# Patient Record
Sex: FEMALE | URBAN - METROPOLITAN AREA
[De-identification: names, ages, dates, MRNs, and addresses within clinical notes are randomized per-mention and may not be internally consistent; named-entity substitution may affect disease eponyms.]

---

## 2017-09-01 PROBLEM — R55 SYNCOPE AND COLLAPSE: Status: ACTIVE | Noted: 2017-09-01

## 2017-09-02 PROBLEM — R55 SYNCOPE: Status: ACTIVE | Noted: 2017-09-02

## 2017-10-01 PROBLEM — A41.9 SEPSIS (HCC): Status: ACTIVE | Noted: 2017-10-01

## 2017-11-14 PROBLEM — R55 VASOVAGAL SYNCOPE: Status: ACTIVE | Noted: 2017-11-14

## 2017-11-14 PROBLEM — N39.0 UTI (URINARY TRACT INFECTION): Status: ACTIVE | Noted: 2017-11-14

## 2017-11-14 PROBLEM — E86.0 DEHYDRATION: Status: ACTIVE | Noted: 2017-11-14

## 2017-11-14 PROBLEM — N18.9 CKD (CHRONIC KIDNEY DISEASE): Status: ACTIVE | Noted: 2017-11-14

## 2018-02-12 ENCOUNTER — IMPORTED ENCOUNTER (OUTPATIENT)
Dept: URBAN - METROPOLITAN AREA CLINIC 1 | Facility: CLINIC | Age: 83
End: 2018-02-12

## 2018-02-12 PROBLEM — Z96.1: Noted: 2018-02-12

## 2018-02-12 PROBLEM — H25.812: Noted: 2018-02-12

## 2018-02-12 PROBLEM — H04.123: Noted: 2018-02-12

## 2018-02-12 PROBLEM — H18.413: Noted: 2018-02-12

## 2018-02-12 PROBLEM — H40.013: Noted: 2018-02-12

## 2018-02-12 PROCEDURE — 92133 CPTRZD OPH DX IMG PST SGM ON: CPT

## 2018-02-12 PROCEDURE — 92004 COMPRE OPH EXAM NEW PT 1/>: CPT

## 2018-02-12 PROCEDURE — 92015 DETERMINE REFRACTIVE STATE: CPT

## 2018-02-12 NOTE — PATIENT DISCUSSION
1.  Cataract OS: Visually Significant discussed the risks benefits alternatives and limitations of cataract surgery. The patient stated a full understanding and a desire to proceed with the procedure. The patient will need to return for preop appointment with cataract measurements and to have any additional questions answered and start pre-operative eye drops as directed. Patient did not see PMG today Phaco PCLOtherwise follow-up2. 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  -Fm HX. Condition was discussed with patient. Will monitor patient for progression. OCT was done today minimal thinning superior OU. Will do VF after phaco3. Dry Eyes OU -- Recommended to patient to use Artificial Tears BID OU4. Arcus OU5. Pseudophakia OD-observe 6. Return for an appointment for H and P. with Dr. Richmond Lindsey.

## 2018-02-18 ENCOUNTER — TELEPHONE (OUTPATIENT)
Dept: BEHAVIORAL/MENTAL HEALTH CLINIC | Age: 83
End: 2018-02-18

## 2018-02-23 ENCOUNTER — IMPORTED ENCOUNTER (OUTPATIENT)
Dept: URBAN - METROPOLITAN AREA CLINIC 1 | Facility: CLINIC | Age: 83
End: 2018-02-23

## 2018-02-23 ENCOUNTER — TELEPHONE (OUTPATIENT)
Dept: FAMILY MEDICINE CLINIC | Age: 83
End: 2018-02-23

## 2018-02-23 PROBLEM — H04.123: Noted: 2018-02-23

## 2018-02-23 PROBLEM — H26.491: Noted: 2018-02-23

## 2018-02-23 PROBLEM — Z96.1: Noted: 2018-02-23

## 2018-02-23 PROBLEM — H40.023: Noted: 2018-02-23

## 2018-02-23 PROBLEM — H25.812: Noted: 2018-02-23

## 2018-02-23 PROCEDURE — 99213 OFFICE O/P EST LOW 20 MIN: CPT

## 2018-02-23 PROCEDURE — 92136 OPHTHALMIC BIOMETRY: CPT

## 2018-02-23 PROCEDURE — 92012 INTRM OPH EXAM EST PATIENT: CPT

## 2018-02-23 NOTE — PATIENT DISCUSSION
1.  Cataract OS: Visually Significant discussed the risks benefits alternatives and limitations of cataract surgery. The patient stated a full understanding and a desire to proceed with the procedure. The patient will need to start pre-operative eye drops as directed. Proceed w/ phaco PCL2. PCO OD Observe and consider yag cap when pt feels pco visually significant and visual acuity decreases to appropriate level. 3. Dry Eyes OU- Stable. The continuation of artificial tears were recommended. 4.  Glaucoma Suspect OU (0.8 OU): Stable IOP OU. Re-eval after phaco. Patient is considered high risk. Condition was discussed with patient and patient understands. Will continue to monitor patient for any progression in condition. Patient was advised to call us with any problems questions or concerns. 5.  Pseudophakia OD [facility])- Doing well. 6.  Return for an appointment for sx OS with Dr. Tin Alejo.

## 2018-02-23 NOTE — TELEPHONE ENCOUNTER
[address] [place] [address] ([phone]) the medical records person is not in today. Name and number of our office was given to have med record personel return call on Monday. Left message with Birgit Fisher with [facility] [phone] to have someone call me or sent us most recent labs and discharge summary. Name and number was given to have Keisha Marroquin return call or fax information.

## 2018-02-27 ENCOUNTER — TELEPHONE (OUTPATIENT)
Dept: FAMILY MEDICINE CLINIC | Age: 83
End: 2018-02-27

## 2018-02-27 NOTE — TELEPHONE ENCOUNTER
Attempted to call office to retrieve information on patient.  staff stated that no one was in for medical records at this time. Name and number was given to him for someone to call me back. Need most recent lab and discharge summary.

## 2018-03-07 ENCOUNTER — IMPORTED ENCOUNTER (OUTPATIENT)
Dept: URBAN - METROPOLITAN AREA CLINIC 1 | Facility: CLINIC | Age: 83
End: 2018-03-07

## 2018-03-07 PROBLEM — H25.812 COMBINED FORM OF SENILE CATARACT OF LEFT EYE: Status: ACTIVE | Noted: 2018-03-07

## 2018-03-07 PROBLEM — H25.812 COMBINED FORM OF SENILE CATARACT OF LEFT EYE: Status: RESOLVED | Noted: 2018-03-07 | Resolved: 2018-03-07

## 2018-03-08 ENCOUNTER — IMPORTED ENCOUNTER (OUTPATIENT)
Dept: URBAN - METROPOLITAN AREA CLINIC 1 | Facility: CLINIC | Age: 83
End: 2018-03-08

## 2018-03-08 PROBLEM — Z96.1: Noted: 2018-03-08

## 2018-03-08 PROCEDURE — 99024 POSTOP FOLLOW-UP VISIT: CPT

## 2018-03-30 ENCOUNTER — IMPORTED ENCOUNTER (OUTPATIENT)
Dept: URBAN - METROPOLITAN AREA CLINIC 1 | Facility: CLINIC | Age: 83
End: 2018-03-30

## 2018-03-30 PROBLEM — Z96.1: Noted: 2018-03-30

## 2018-03-30 PROCEDURE — 99024 POSTOP FOLLOW-UP VISIT: CPT

## 2018-03-30 NOTE — PATIENT DISCUSSION
POW#3 Phaco/ PCL OS (Standard) Doing well Finish PO meds per schedule MRX for glasses given Return for an appointment in 4-6 mo 30 OCT with Dr. Valeria Zavaleta.

## 2018-04-30 ENCOUNTER — OFFICE VISIT (OUTPATIENT)
Dept: FAMILY MEDICINE CLINIC | Age: 83
End: 2018-04-30

## 2018-04-30 VITALS
WEIGHT: 116.2 LBS | HEIGHT: 62 IN | TEMPERATURE: 97.5 F | HEART RATE: 60 BPM | OXYGEN SATURATION: 96 % | SYSTOLIC BLOOD PRESSURE: 160 MMHG | DIASTOLIC BLOOD PRESSURE: 74 MMHG | BODY MASS INDEX: 21.38 KG/M2 | RESPIRATION RATE: 16 BRPM

## 2018-04-30 DIAGNOSIS — R60.0 BILATERAL EDEMA OF LOWER EXTREMITY: ICD-10-CM

## 2018-04-30 DIAGNOSIS — I10 ESSENTIAL HYPERTENSION: Primary | ICD-10-CM

## 2018-04-30 PROBLEM — R55 SYNCOPE AND COLLAPSE: Status: RESOLVED | Noted: 2017-09-01 | Resolved: 2018-04-30

## 2018-04-30 PROBLEM — A41.9 SEPSIS (HCC): Status: RESOLVED | Noted: 2017-10-01 | Resolved: 2018-04-30

## 2018-04-30 PROBLEM — R55 SYNCOPE: Status: RESOLVED | Noted: 2017-09-02 | Resolved: 2018-04-30

## 2018-04-30 PROBLEM — N39.0 UTI (URINARY TRACT INFECTION): Status: RESOLVED | Noted: 2017-11-14 | Resolved: 2018-04-30

## 2018-04-30 RX ORDER — AMLODIPINE BESYLATE 10 MG/1
10 TABLET ORAL DAILY
Qty: 90 TAB | Refills: 1 | Status: SHIPPED | OUTPATIENT
Start: 2018-04-30 | End: 2018-10-09 | Stop reason: SDUPTHER

## 2018-04-30 NOTE — PROGRESS NOTES
Chief Complaint   Patient presents with    New Patient     1. Have you been to the ER, urgent care clinic since your last visit? Hospitalized since your last visit? No    2. Have you seen or consulted any other health care providers outside of the [facility] since your last visit? Include any pap smears or colon screening.  No

## 2018-04-30 NOTE — MR AVS SNAPSHOT
[place] [address] [address] [address] 
[phone] Patient: Agatha Howe MRN: [id number] :1934 Visit Information Date & Time Provider Department Dept. Phone Encounter #  
 2018  1:00 PM Gary Mckinney MD [facility] [phone] [id number] Follow-up Instructions Return in about 4 weeks (around 2018), or if symptoms worsen or fail to improve, for HTN. Upcoming Health Maintenance Date Due DTaP/Tdap/Td series (1 - Tdap) 1955 ZOSTER VACCINE AGE 60> 1994 GLAUCOMA SCREENING Q2Y 1999 Bone Densitometry (Dexa) Screening 1999 Pneumococcal 65+ Low/Medium Risk (1 of 2 - PCV13) 1999 MEDICARE YEARLY EXAM 3/20/2018 Influenza Age 5 to Adult 2018 Allergies as of 2018  Review Complete On: 2018 By: Dewey Raines LPN Severity Noted Reaction Type Reactions Ace Inhibitors  2017    Angioedema Shellfish Derived  2018    Hives, Swelling Current Immunizations  Never Reviewed Name Date Influenza Vaccine 10/25/2014 12:00 AM  
 Pneumococcal Polysaccharide (PPSV-23) 10/25/2012 12:00 AM  
 Tetanus Toxoid 2013 12:00 AM  
  
 Not reviewed this visit You Were Diagnosed With   
  
 Codes Comments Essential hypertension    -  Primary ICD-10-CM: I10 
ICD-9-CM: 401.9 Bilateral edema of lower extremity     ICD-10-CM: R60.0 ICD-9-CM: 808. 3 Vitals BP Pulse Temp Resp Height(growth percentile) Weight(growth percentile) 160/74 60 97.5 °F (36.4 °C) (Oral) 16 5' 2\" (1.575 m) 116 lb 3.2 oz (52.7 kg) SpO2 Breastfeeding? BMI OB Status Smoking Status 96% No 21.25 kg/m2 Hysterectomy Never Smoker Vitals History BMI and BSA Data Body Mass Index Body Surface Area  
 21.25 kg/m 2 1.52 m 2 [facility] [address] [phone] Your Updated Medication List  
  
   
This list is accurate as of 4/30/18  1:51 PM.  Always use your most recent med list. amLODIPine 10 mg tablet Commonly known as:  Leggett Inks Take 1 Tab by mouth daily for 180 days. ascorbic acid (vitamin C) 250 mg tablet Commonly known as:  VITAMIN C Take 1 Tab by mouth two (2) times a day. ergocalciferol 50,000 unit capsule Commonly known as:  ERGOCALCIFEROL Take 1 Cap by mouth every seven (7) days. ferrous sulfate 325 mg (65 mg iron) tablet Commonly known as:  Iron (Ferrous Sulfate) Take 1 Tab by mouth two (2) times a day. mirtazapine 7.5 mg tablet Commonly known as:  Andrez Cooks Take 1 Tab by mouth nightly. Prescriptions Sent to Pharmacy Refills  
 amLODIPine (NORVASC) 10 mg tablet 1 Sig: Take 1 Tab by mouth daily for 180 days. Class: Normal  
 Pharmacy: [address] [place] [address], [address] [place] [address] #: [phone] Route: Oral  
  
Follow-up Instructions Return in about 4 weeks (around 5/28/2018), or if symptoms worsen or fail to improve, for HTN. Patient Instructions FOR BLOOD PRESSURE: 
TAKE AMLODIPINE 10 MG (2 OF 5 MG TABLETS) IN MORNING 
MAXIMUM SALT 1.5 GRAMS (1500 MG) IN 24 HOURS 
ELEVATE LEGS DURING DAY WHEN NOT UP AND WALKING AROUND FOR LEG SWELLING 
OKAY TO USE COMPRESSION STOCKINGS FOR LEG SWELLING 
RETURN IN 4 WEEKS 
BRING ADVANCED CARE PLANNING FORM BACK WITH YOU NEXT VISIT. DASH Diet: Care Instructions Your Care Instructions The DASH diet is an eating plan that can help lower your blood pressure. DASH stands for Dietary Approaches to Stop Hypertension. Hypertension is high blood pressure. The DASH diet focuses on eating foods that are high in calcium, potassium, and magnesium. These nutrients can lower blood pressure.  The foods that are highest in these nutrients are fruits, vegetables, low-fat dairy products, nuts, seeds, and legumes. But taking calcium, potassium, and magnesium supplements instead of eating foods that are high in those nutrients does not have the same effect. The DASH diet also includes whole grains, fish, and poultry. The DASH diet is one of several lifestyle changes your doctor may recommend to lower your high blood pressure. Your doctor may also want you to decrease the amount of sodium in your diet. Lowering sodium while following the DASH diet can lower blood pressure even further than just the DASH diet alone. Follow-up care is a key part of your treatment and safety. Be sure to make and go to all appointments, and call your doctor if you are having problems. It's also a good idea to know your test results and keep a list of the medicines you take. How can you care for yourself at home? Following the DASH diet · Eat 4 to 5 servings of fruit each day. A serving is 1 medium-sized piece of fruit, ½ cup chopped or canned fruit, 1/4 cup dried fruit, or 4 ounces (½ cup) of fruit juice. Choose fruit more often than fruit juice. · Eat 4 to 5 servings of vegetables each day. A serving is 1 cup of lettuce or raw leafy vegetables, ½ cup of chopped or cooked vegetables, or 4 ounces (½ cup) of vegetable juice. Choose vegetables more often than vegetable juice. · Get 2 to 3 servings of low-fat and fat-free dairy each day. A serving is 8 ounces of milk, 1 cup of yogurt, or 1 ½ ounces of cheese. · Eat 6 to 8 servings of grains each day. A serving is 1 slice of bread, 1 ounce of dry cereal, or ½ cup of cooked rice, pasta, or cooked cereal. Try to choose whole-grain products as much as possible. · Limit lean meat, poultry, and fish to 2 servings each day. A serving is 3 ounces, about the size of a deck of cards.  
· Eat 4 to 5 servings of nuts, seeds, and legumes (cooked dried beans, lentils, and split peas) each week. A serving is 1/3 cup of nuts, 2 tablespoons of seeds, or ½ cup of cooked beans or peas. · Limit fats and oils to 2 to 3 servings each day. A serving is 1 teaspoon of vegetable oil or 2 tablespoons of salad dressing. · Limit sweets and added sugars to 5 servings or less a week. A serving is 1 tablespoon jelly or jam, ½ cup sorbet, or 1 cup of lemonade. · Eat less than 2,300 milligrams (mg) of sodium a day. If you limit your sodium to 1,500 mg a day, you can lower your blood pressure even more. Tips for success · Start small. Do not try to make dramatic changes to your diet all at once. You might feel that you are missing out on your favorite foods and then be more likely to not follow the plan. Make small changes, and stick with them. Once those changes become habit, add a few more changes. · Try some of the following: ¨ Make it a goal to eat a fruit or vegetable at every meal and at snacks. This will make it easy to get the recommended amount of fruits and vegetables each day. ¨ Try yogurt topped with fruit and nuts for a snack or healthy dessert. ¨ Add lettuce, tomato, cucumber, and onion to sandwiches. ¨ Combine a ready-made pizza crust with low-fat mozzarella cheese and lots of vegetable toppings. Try using tomatoes, squash, spinach, broccoli, carrots, cauliflower, and onions. ¨ Have a variety of cut-up vegetables with a low-fat dip as an appetizer instead of chips and dip. ¨ Sprinkle sunflower seeds or chopped almonds over salads. Or try adding chopped walnuts or almonds to cooked vegetables. ¨ Try some vegetarian meals using beans and peas. Add garbanzo or kidney beans to salads. Make burritos and tacos with mashed grant beans or black beans. Where can you learn more? Go to http://pino-naomi.info/. Enter E072 in the search box to learn more about \"DASH Diet: Care Instructions. \" Current as of: September 21, 2016 Content Version: 11.4 © 4200-9121 Healthwise, Incorporated. Care instructions adapted under license by [employer] (which disclaims liability or warranty for this information). If you have questions about a medical condition or this instruction, always ask your healthcare professional. [address] any warranty or liability for your use of this information. Introducing [facility]! [facility] introduces BriefMe patient portal. Now you can access parts of your medical record, email your doctor's office, and request medication refills online. 1. In your internet browser, go to [email or web address] 2. Click on the First Time User? Click Here link in the Sign In box. You will see the New Member Sign Up page. 3. Enter your BriefMe Access Code exactly as it appears below. You will not need to use this code after youve completed the sign-up process. If you do not sign up before the expiration date, you must request a new code. · BriefMe Access Code: [email or web address] Expires: 6/5/2018  3:19 PM 
 
4. Enter the last four digits of your Social Security Number (xxxx) and Date of Birth (mm/dd/yyyy) as indicated and click Submit. You will be taken to the next sign-up page. 5. Create a BriefMe ID. This will be your BriefMe login ID and cannot be changed, so think of one that is secure and easy to remember. 6. Create a BriefMe password. You can change your password at any time. 7. Enter your Password Reset Question and Answer. This can be used at a later time if you forget your password. 8. Enter your e-mail address. You will receive e-mail notification when new information is available in [address] 9. Click Sign Up. You can now view and download portions of your medical record. 10. Click the Download Summary menu link to download a portable copy of your medical information.  
 
If you have questions, please visit the Frequently Asked Questions section of the Connect HQ. Remember, [employer] is NOT to be used for urgent needs. For medical emergencies, dial 911. Now available from your iPhone and Android! Please provide this summary of care documentation to your next provider. Your primary care clinician is listed as Ariela Jimenez. If you have any questions after today's visit, please call [phone].

## 2018-04-30 NOTE — PROGRESS NOTES
INTERNAL MEDICINE INITIAL PROVIDER VISIT    SUBJECTIVE:     Chief Complaint   Patient presents with    New Patient       HPI: 80 y.o. female with PMHx significant for uncontrolled HTN is here for the above chief complaint(s). Establish care: last PCP months ago last blood work at that time. States feels \"fine\" today. No physical complaints. Hypertension: Today BP is uncontrolled. Taking amlodipine 5 mg daily. No side effects from medications. Medication good compliance. Denies headache, lightheadedness, dizziness, vision changes, chest pain at rest or with exertion, rapid/irregular heart rate, shortness of breath at rest or with exertion cough, abdominal pain, legg pain. Chronic leg swelling worse with prolonged standing/sitting R>L. UTI/sepsis: [facility] 11/14-17/2017. Sx Resolved. drinking water. No pain with urination. No abdominal pain, nausea, vomiting. No fever or chills. No passing out episodes since hospitalization 11/2017 for urosepsis. ROS: 12 point ROS completed positive per HPI. Current Outpatient Prescriptions   Medication Sig    amLODIPine (NORVASC) 10 mg tablet Take 1 Tab by mouth daily for 180 days.  ergocalciferol (ERGOCALCIFEROL) 50,000 unit capsule Take 1 Cap by mouth every seven (7) days. (Patient taking differently: Take 50,000 Units by mouth two (2) times a week.)    mirtazapine (REMERON) 7.5 mg tablet Take 1 Tab by mouth nightly.  ferrous sulfate (IRON, FERROUS SULFATE,) 325 mg (65 mg iron) tablet Take 1 Tab by mouth two (2) times a day.  ascorbic acid, vitamin C, (VITAMIN C) 250 mg tablet Take 1 Tab by mouth two (2) times a day. No current facility-administered medications for this visit.       Health Maintenance   Topic Date Due    DTaP/Tdap/Td series (1 - Tdap) [birth year]28/1955    ZOSTER VACCINE AGE 60>  04/28/1994    GLAUCOMA SCREENING Q2Y  06/28/1999    Bone Densitometry (Dexa) Screening  06/28/1999    Pneumococcal 65+ Low/Medium Risk (2 of 2 - PCV13) 10/25/2013    MEDICARE YEARLY EXAM  03/20/2018    Influenza Age 5 to Adult  08/01/2018       Medications and Allergies: Reviewed and confirmed in the chart    Past Medical Hx: Reviewed and confirmed in the chart  Patient Active Problem List   Diagnosis Code    Vasovagal syncope R55    CKD (chronic kidney disease) N18.9    Bilateral edema of lower extremity R60.0    Chronic anemia D64.9    GERD (gastroesophageal reflux disease) K21.9    Congestive heart failure (CHF) ([facility]) I50.9    History of ETOH abuse Z87.898    Hypertension I10    Nephrolithiasis N20.0    Supraventricular bigeminy R00.8     Family Hx, Surgical Hx, Social Hx: Reviewed and updated in EMR    OBJECTIVE:  Vitals:    04/30/18 1315 04/30/18 1333   BP: 189/87 160/74   Pulse: 68 60   Resp: 16    Temp: 97.5 °F (36.4 °C)    TempSrc: Oral    SpO2: 96%    Weight: 116 lb 3.2 oz (52.7 kg)    Height: 5' 2\" (1.575 m)        BP Readings from Last 3 Encounters:   04/30/18 160/74   03/07/18 (!) 186/108   11/17/17 150/61     Wt Readings from Last 3 Encounters:   04/30/18 116 lb 3.2 oz (52.7 kg)   03/06/18 110 lb (49.9 kg)   11/17/17 121 lb 11.1 oz (55.2 kg)       General: Pleasant elderly woman sitting comfortably in no acute distress  HEENT: Head is atraumatic normo-cephalic. CVS: Heart regular, rate, and rhythm. Audible S1 and S2. No murmurs, rubs or gallops. PULM: Lungs clear to auscultation bilaterally. No wheezes, rales or rhonchi. GI: Positive bowel sounds, soft, nondistended, non-tender. EXT: 2+ radial pulses bilaterally. non pitting 1+ bilateral bi pedal edema R>L (per patient chronic)  Neuro: Alert and oriented x person, place, date and situation. Gait arthralgic and steady with 4 point cane. MSE: Mood euthymic, affect congruent and reactive. Nursing Notes Reviewed    ASSESSMENT AND PLAN    ICD-10-CM ICD-9-CM    1.  Essential hypertension I10 401.9 amLODIPine (NORVASC) 10 mg tablet INCREASED  SACHIN signed for past blood work  Increase water,   Limit caffiene  DASH diet  RTC in 4 weeks   2. Bilateral edema of lower extremity R60.0 782.3 Rx for compression stockings ordered  SACHIN for past PCP        Orders Placed This Encounter    amLODIPine (NORVASC) 10 mg tablet       Future Appointments  Date Time Provider Varg[place]se Leatha   5/30/2018 3:00 PM Ayden Adler MD [address] [place] [address]       AVS printed and provided to patient    Assessment and plan above discussed with patient, patient voiced understanding and agreement with plan. More than 50% of this 45 MIN visit was spent face to face counseling the patient about the etiology and treatment options for the above health conditions outlined in assessment and plan       Ayden Adler M.D.   [address], [address] [place] [address], [address] [place] [address] [phone]  [facility] [phone]

## 2018-04-30 NOTE — PATIENT INSTRUCTIONS
FOR BLOOD PRESSURE:  TAKE AMLODIPINE 10 MG (2 OF 5 MG TABLETS) IN MORNING  MAXIMUM SALT 1.5 GRAMS (1500 MG) IN 24 HOURS  ELEVATE LEGS DURING DAY WHEN NOT UP AND WALKING AROUND FOR LEG SWELLING  OKAY TO USE COMPRESSION STOCKINGS FOR LEG SWELLING  RETURN IN 4 WEEKS  BRING ADVANCED CARE PLANNING FORM BACK WITH YOU NEXT VISIT. DASH Diet: Care Instructions  Your Care Instructions    The DASH diet is an eating plan that can help lower your blood pressure. DASH stands for Dietary Approaches to Stop Hypertension. Hypertension is high blood pressure. The DASH diet focuses on eating foods that are high in calcium, potassium, and magnesium. These nutrients can lower blood pressure. The foods that are highest in these nutrients are fruits, vegetables, low-fat dairy products, nuts, seeds, and legumes. But taking calcium, potassium, and magnesium supplements instead of eating foods that are high in those nutrients does not have the same effect. The DASH diet also includes whole grains, fish, and poultry. The DASH diet is one of several lifestyle changes your doctor may recommend to lower your high blood pressure. Your doctor may also want you to decrease the amount of sodium in your diet. Lowering sodium while following the DASH diet can lower blood pressure even further than just the DASH diet alone. Follow-up care is a key part of your treatment and safety. Be sure to make and go to all appointments, and call your doctor if you are having problems. It's also a good idea to know your test results and keep a list of the medicines you take. How can you care for yourself at home? Following the DASH diet  · Eat 4 to 5 servings of fruit each day. A serving is 1 medium-sized piece of fruit, ½ cup chopped or canned fruit, 1/4 cup dried fruit, or 4 ounces (½ cup) of fruit juice. Choose fruit more often than fruit juice. · Eat 4 to 5 servings of vegetables each day.  A serving is 1 cup of lettuce or raw leafy vegetables, ½ cup of chopped or cooked vegetables, or 4 ounces (½ cup) of vegetable juice. Choose vegetables more often than vegetable juice. · Get 2 to 3 servings of low-fat and fat-free dairy each day. A serving is 8 ounces of milk, 1 cup of yogurt, or 1 ½ ounces of cheese. · Eat 6 to 8 servings of grains each day. A serving is 1 slice of bread, 1 ounce of dry cereal, or ½ cup of cooked rice, pasta, or cooked cereal. Try to choose whole-grain products as much as possible. · Limit lean meat, poultry, and fish to 2 servings each day. A serving is 3 ounces, about the size of a deck of cards. · Eat 4 to 5 servings of nuts, seeds, and legumes (cooked dried beans, lentils, and split peas) each week. A serving is 1/3 cup of nuts, 2 tablespoons of seeds, or ½ cup of cooked beans or peas. · Limit fats and oils to 2 to 3 servings each day. A serving is 1 teaspoon of vegetable oil or 2 tablespoons of salad dressing. · Limit sweets and added sugars to 5 servings or less a week. A serving is 1 tablespoon jelly or jam, ½ cup sorbet, or 1 cup of lemonade. · Eat less than 2,300 milligrams (mg) of sodium a day. If you limit your sodium to 1,500 mg a day, you can lower your blood pressure even more. Tips for success  · Start small. Do not try to make dramatic changes to your diet all at once. You might feel that you are missing out on your favorite foods and then be more likely to not follow the plan. Make small changes, and stick with them. Once those changes become habit, add a few more changes. · Try some of the following:  ¨ Make it a goal to eat a fruit or vegetable at every meal and at snacks. This will make it easy to get the recommended amount of fruits and vegetables each day. ¨ Try yogurt topped with fruit and nuts for a snack or healthy dessert. ¨ Add lettuce, tomato, cucumber, and onion to sandwiches. ¨ Combine a ready-made pizza crust with low-fat mozzarella cheese and lots of vegetable toppings.  Try using tomatoes, squash, spinach, broccoli, carrots, cauliflower, and onions. ¨ Have a variety of cut-up vegetables with a low-fat dip as an appetizer instead of chips and dip. ¨ Sprinkle sunflower seeds or chopped almonds over salads. Or try adding chopped walnuts or almonds to cooked vegetables. ¨ Try some vegetarian meals using beans and peas. Add garbanzo or kidney beans to salads. Make burritos and tacos with mashed grant beans or black beans. Where can you learn more? Go to http://pino-naomi.info/. Enter P841 in the search box to learn more about \"DASH Diet: Care Instructions. \"  Current as of: September 21, 2016  Content Version: 11.4  © 0366-3932 [employer]. Care instructions adapted under license by [employer] (which disclaims liability or warranty for this information). If you have questions about a medical condition or this instruction, always ask your healthcare professional. [address] any warranty or liability for your use of this information.

## 2018-05-30 ENCOUNTER — OFFICE VISIT (OUTPATIENT)
Dept: FAMILY MEDICINE CLINIC | Age: 83
End: 2018-05-30

## 2018-05-30 VITALS
TEMPERATURE: 97.1 F | HEIGHT: 62 IN | HEART RATE: 54 BPM | BODY MASS INDEX: 21.16 KG/M2 | OXYGEN SATURATION: 96 % | DIASTOLIC BLOOD PRESSURE: 70 MMHG | RESPIRATION RATE: 16 BRPM | SYSTOLIC BLOOD PRESSURE: 158 MMHG | WEIGHT: 115 LBS

## 2018-05-30 DIAGNOSIS — N18.9 CHRONIC KIDNEY DISEASE, UNSPECIFIED CKD STAGE: ICD-10-CM

## 2018-05-30 DIAGNOSIS — F03.90 MAJOR NEUROCOGNITIVE DISORDER (HCC): ICD-10-CM

## 2018-05-30 DIAGNOSIS — D50.9 IRON DEFICIENCY ANEMIA, UNSPECIFIED IRON DEFICIENCY ANEMIA TYPE: ICD-10-CM

## 2018-05-30 DIAGNOSIS — Z00.00 MEDICARE ANNUAL WELLNESS VISIT, SUBSEQUENT: ICD-10-CM

## 2018-05-30 DIAGNOSIS — I10 ESSENTIAL HYPERTENSION: Primary | ICD-10-CM

## 2018-05-30 DIAGNOSIS — R60.0 BILATERAL EDEMA OF LOWER EXTREMITY: ICD-10-CM

## 2018-05-30 DIAGNOSIS — H91.93 BILATERAL HEARING LOSS, UNSPECIFIED HEARING LOSS TYPE: ICD-10-CM

## 2018-05-30 NOTE — MR AVS SNAPSHOT
[address] [place] [address] [address] [address] 
[phone] Patient: Bunny Gao MRN: [id number] :1934 Visit Information Date & Time Provider Department Dept. Phone Encounter #  
 2018  3:00 PM Raquel Suarez MD [address] [phone] [id number] Follow-up Instructions Return in about 4 weeks (around 2018), or if symptoms worsen or fail to improve, for HTN CKD, anemia, DEMENTIA. Upcoming Health Maintenance Date Due DTaP/Tdap/Td series (1 - Tdap) 1955 ZOSTER VACCINE AGE 60> 1994 GLAUCOMA SCREENING Q2Y 1999 Bone Densitometry (Dexa) Screening 1999 Pneumococcal 65+ Low/Medium Risk (2 of 2 - PCV13) 10/25/2013 Influenza Age 5 to Adult 2018 MEDICARE YEARLY EXAM 2019 Allergies as of 2018  Review Complete On: 2018 By: Raquel Suarez MD  
  
 Severity Noted Reaction Type Reactions Ace Inhibitors  2017    Angioedema Shellfish Derived  2018    Hives, Swelling Current Immunizations  Never Reviewed Name Date Influenza Vaccine 10/25/2014 12:00 AM  
 Pneumococcal Polysaccharide (PPSV-23) 10/25/2012 12:00 AM  
 Tetanus Toxoid 2013 12:00 AM  
  
 Not reviewed this visit You Were Diagnosed With   
  
 Codes Comments Essential hypertension    -  Primary ICD-10-CM: I10 
ICD-9-CM: 401.9 Bilateral hearing loss, unspecified hearing loss type     ICD-10-CM: H91.93 
ICD-9-CM: 389.9 Bilateral edema of lower extremity     ICD-10-CM: R60.0 ICD-9-CM: 782.3 Iron deficiency anemia, unspecified iron deficiency anemia type     ICD-10-CM: D50.9 ICD-9-CM: 280.9 Medicare annual wellness visit, subsequent     ICD-10-CM: Z00.00 ICD-9-CM: V70.0 Chronic kidney disease, unspecified CKD stage     ICD-10-CM: N18.9 ICD-9-CM: 585.9 Major neurocognitive disorder     ICD-10-CM: F03.90 ICD-9-CM: 294.20 Vitals BP Pulse Temp Resp Height(growth percentile) Weight(growth percentile) 158/70 (!) 54 97.1 °F (36.2 °C) (Oral) 16 5' 2\" (1.575 m) 115 lb (52.2 kg) SpO2 BMI OB Status Smoking Status 96% 21.03 kg/m2 Hysterectomy Never Smoker Vitals History BMI and BSA Data Body Mass Index Body Surface Area 21.03 kg/m 2 1.51 m 2 Preferred Pharmacy Pharmacy Name Phone Deysi Chan, [facility] [phone] Your Updated Medication List  
  
   
This list is accurate as of 5/30/18  4:15 PM.  Always use your most recent med list. amLODIPine 10 mg tablet Commonly known as:  Delcie Marquez Take 1 Tab by mouth daily for 180 days. ascorbic acid (vitamin C) 250 mg tablet Commonly known as:  VITAMIN C Take 1 Tab by mouth two (2) times a day. ergocalciferol 50,000 unit capsule Commonly known as:  ERGOCALCIFEROL Take 1 Cap by mouth every seven (7) days. ferrous sulfate 325 mg (65 mg iron) tablet Commonly known as:  Iron (Ferrous Sulfate) Take 1 Tab by mouth two (2) times a day. mirtazapine 7.5 mg tablet Commonly known as:  Phyllistine Limber Take 1 Tab by mouth nightly. We Performed the Following REFERRAL TO AUDIOLOGY [REF7 Custom] Comments:  
 Please evaluate patient for hear loss, eval and treatment recs [facility]. Follow-up Instructions Return in about 4 weeks (around 6/27/2018), or if symptoms worsen or fail to improve, for HTN CKD, anemia, DEMENTIA. To-Do List   
 05/30/2018 Lab:  CBC WITH AUTOMATED DIFF   
  
 05/30/2018 Lab:  METABOLIC PANEL, BASIC Referral Information Referral ID Referred By Referred To  
  
 [id number] [place] [facility] Not Available Visits Status Start Date End Date 1 New Request 5/30/18 5/30/19  If your referral has a status of pending review or denied, additional information will be sent to support the outcome of this decision. Patient Instructions [place] BACK NEXT VISIT We are sending a referral to [phone] [facility] . You should be called about this in 1-2 weeks. If no word in 2 weeks please give us a call to follow up TAKE NORVASC EVERY DAY IN MORNING 10 MG 
 
GET COMPRESSION STOCKINGS RETURN TO CLINIC IN 4 WEEKS FOR FOLLOW UP OF BLOOD PRESSURE 
 
GET BLOOD WORK 1 WEEK BEFORE NEXT VISIT, NO NEED TO FAST, NO NEED TO SCHEDULE AN APPOINTMENT, LAB OPENS 8:30AM TO 4 PM EVERY DAY Learning About the Symptoms of Dementia What is dementia? We all forget things as we get older. Many older people have a slight loss of memory that does not affect their daily lives. But memory loss that gets worse may mean that you have dementia. Dementia is a loss of mental skills that affects your daily life. It can cause problems with memory, problem-solving, and learning. It also can cause problems with thinking and planning. Dementia usually gets worse over time. But how quickly it gets worse is different for each person. Some people stay the same for years. Others lose skills quickly. Your chances of having dementia rise as you get older. But this doesn't mean that everyone will get it. What causes dementia? Dementia is caused by damage to or changes in the brain. Alzheimer's disease is the most common kind of dementia. Alzheimer's causes a steady loss of memory and how well you can speak, think, and do your daily activities. The second most common kind of dementia is caused by a series of strokes. It's called vascular dementia. It often gets worse step by step. With each new stroke, more mental skills are lost. 
Serious head injuries in the past can sometimes lead to dementia, too. What are the symptoms? Usually the first symptom of dementia is memory loss.  Often the person who has the memory problem doesn't notice it, but family and friends do. People who have dementia may have increasing trouble with: 
· Recalling recent events. They may forget appointments or lose objects. · Recognizing people and places. · Keeping up with conversations and activity. · Finding their way around familiar places, or driving to and from places they know well. · Keeping up personal care such as grooming or bathing. · Planning and carrying out routine tasks. They may have trouble following a recipe or writing a letter or email. What are some next steps? If you are worried about memory loss, see your doctor soon. He or she can do a physical exam and ask questions about recent and past illnesses and life events. Think about bringing someone to your doctor's appointment to take notes for you. Your doctor may suggest a series of tests to measure memory changes over time. These tests give the doctor an overall picture of how well your brain is working. The doctor can use the results to decide the best treatment program and help make your life safer and easier. It is important to know that memory loss can be caused by things other than dementia. These things can include health problems such as depression, a low amount of thyroid hormone, and some infections. When those things are treated, your ability to remember can get better. Follow-up care is a key part of your treatment and safety. Be sure to make and go to all appointments, and call your doctor if you are having problems. It's also a good idea to know your test results and keep a list of the medicines you take. Where can you learn more? Go to http://pino-naomi.info/. Enter [phone] in the search box to learn more about \"Learning About the Symptoms of Dementia. \" Current as of: May 12, 2017 Content Version: 11.4 © 0096-2813 Healthwise, Incorporated.  Care instructions adapted under license by [address] (which disclaims liability or warranty for this information). If you have questions about a medical condition or this instruction, always ask your healthcare professional. [address] any warranty or liability for your use of this information. Introducing [facility] HE[facility]! [facility] introduces [employer] patient portal. Now you can access parts of your medical record, email your doctor's office, and request medication refills online. 1. In your internet browser, go to [email or web address] 2. Click on the First Time User? Click Here link in the Sign In box. You will see the New Member Sign Up page. 3. Enter your [employer] Access Code exactly as it appears below. You will not need to use this code after youve completed the sign-up process. If you do not sign up before the expiration date, you must request a new code. · [employer] Access Code: [email or web address] Expires: 6/5/2018  3:19 PM 
 
4. Enter the last four digits of your Social Security Number (xxxx) and Date of Birth (mm/dd/yyyy) as indicated and click Submit. You will be taken to the next sign-up page. 5. Create a [employer] ID. This will be your [employer] login ID and cannot be changed, so think of one that is secure and easy to remember. 6. Create a [employer] password. You can change your password at any time. 7. Enter your Password Reset Question and Answer. This can be used at a later time if you forget your password. 8. Enter your e-mail address. You will receive e-mail notification when new information is available in [address] 9. Click Sign Up. You can now view and download portions of your medical record. 10. Click the Download Summary menu link to download a portable copy of your medical information. If you have questions, please visit the Frequently Asked Questions section of the [employer] website.  Remember, [employer] is NOT to be used for urgent needs. For medical emergencies, dial 911. Now available from your iPhone and Android! Please provide this summary of care documentation to your next provider. Your primary care clinician is listed as Ena Kumar. If you have any questions after today's visit, please call [phone].

## 2018-05-30 NOTE — PROGRESS NOTES
Internal Medicine Follow Up Visit Note    Chief Complaint   Patient presents with    Hypertension    Memory Loss       HPI:  Marii Hoang is a 80 y.o.  female with history significant for CKD, h/o CVA, memory deficit, h/o urosepsis is here for the above complaint(s). Last seen 4/30/2018 for establish care. Son Zach Moore dropped off. Hypertension: Today BP is uncontrolled. Taking amlodipine 10 mg, increased last visit, last dose unsure, thinks she may have taken this AM. No side effects from medications. Medication fair compliance. Denies headache, lightheadedness, dizziness, vision changes, chest pain, rapid/irregular heart rate, shortness of breath, cough, abdominal pain. Chronic leg swelling, no compression stockings, unsure if she picked them up after last visit when they were prescribed. Water intake, unclear amount. Memory testing: Oriented to person, date, month, year, city. Taking medication independently, meal preparation, walking with can, toileting and dressing independently. Gets help with bills from granddaughter. Driving, gets help from son getting to doctors appointments. No forgetting to take medicines or make it to doctors appointments. Living by self. ROS: as per HPI    Current Outpatient Prescriptions   Medication Sig    amLODIPine (NORVASC) 10 mg tablet Take 1 Tab by mouth daily for 180 days.  ergocalciferol (ERGOCALCIFEROL) 50,000 unit capsule Take 1 Cap by mouth every seven (7) days. (Patient taking differently: Take 50,000 Units by mouth two (2) times a week.)    mirtazapine (REMERON) 7.5 mg tablet Take 1 Tab by mouth nightly.  ferrous sulfate (IRON, FERROUS SULFATE,) 325 mg (65 mg iron) tablet Take 1 Tab by mouth two (2) times a day.  ascorbic acid, vitamin C, (VITAMIN C) 250 mg tablet Take 1 Tab by mouth two (2) times a day. No current facility-administered medications for this visit.       Health Maintenance   Topic Date Due    DTaP/Tdap/Td series (1 - Tdap) 06/28/1955    ZOSTER VACCINE AGE 60>  04/28/1994    GLAUCOMA SCREENING Q2Y  06/28/1999    Bone Densitometry (Dexa) Screening  06/28/1999    Pneumococcal 65+ Low/Medium Risk (2 of 2 - PCV13) 10/25/2013    Influenza Age 9 to Adult  08/01/2018    MEDICARE YEARLY EXAM  05/31/2019     Immunization History   Administered Date(s) Administered    Influenza Vaccine 10/25/2014    Pneumococcal Polysaccharide (PPSV-23) 10/25/2012    Tetanus Toxoid 02/20/2013       Allergies and Medications: Reviewed and updated in EMR. Patient Active Problem List   Diagnosis Code    Vasovagal syncope R55    CKD (chronic kidney disease) N18.9    Bilateral edema of lower extremity R60.0    Iron deficiency anemia D50.9    GERD (gastroesophageal reflux disease) K21.9    Congestive heart failure (CHF) (HCC) I50.9    History of ETOH abuse Z87.898    Hypertension I10    Nephrolithiasis N20.0    Supraventricular bigeminy R00.8    Major neurocognitive disorder F03.90       Family History, Social History, Past Medical History, Surgical History: Reviewed and updated in EMR as appropriate. OBJECTIVE:   Visit Vitals    /70    Pulse (!) 54    Temp 97.1 °F (36.2 °C) (Oral)    Resp 16    Ht 5' 2\" (1.575 m)    Wt 115 lb (52.2 kg)    SpO2 96%  Comment: ra    BMI 21.03 kg/m2        BP Readings from Last 3 Encounters:   05/30/18 158/70   04/30/18 160/74   03/07/18 (!) 186/108     Wt Readings from Last 3 Encounters:   05/30/18 115 lb (52.2 kg)   04/30/18 116 lb 3.2 oz (52.7 kg)   03/06/18 110 lb (49.9 kg)       General: Pleasant elderly woman dressed casually, fair GH in no acute distress  HEENT: Head is atraumatic normo-cephalic. CVS:  Heart regular, rate, and rhythm. Audible S1 and S2. No murmurs, rubs or gallops. PULM: Lungs clear to auscultation bilaterally. No wheezes, rales or rhonchi. GI: Positive bowel sounds, soft, nondistended, non-tender. EXT: 2+ radial pulses bilaterally.  No pedal edema bilaterally  Neuro: Alert and oriented x3. Gait arthralgic with cane. MSE: mood eutymic, affect congruent    MOCA: 20/30 4/5 orientation, 2/3 identification, 2/6 attention, 1/3 language, 1/2 abstraction, 4/5 delayed recall, 5/6 orientation    Nursing Notes Reviewed. LABS/RADIOLOGICAL TESTS:    Lab Results   Component Value Date/Time    WBC 9.3 11/17/2017 05:34 AM    HGB 8.4 (L) 11/17/2017 05:34 AM    HCT 25.3 (L) 11/17/2017 05:34 AM    PLATELET 096 04/81/7579 05:34 AM     Lab Results   Component Value Date/Time    Sodium 139 11/17/2017 05:34 AM    Potassium 4.1 11/17/2017 05:34 AM    Chloride 110 (H) 11/17/2017 05:34 AM    CO2 20 (L) 11/17/2017 05:34 AM    Glucose 87 11/17/2017 05:34 AM    BUN 23 11/17/2017 05:34 AM    Creatinine 1.1 11/17/2017 05:34 AM     Lab Results   Component Value Date/Time    Cholesterol, total 171 11/16/2017 04:44 AM    HDL Cholesterol 69 11/16/2017 04:44 AM    LDL, calculated 80 11/16/2017 04:44 AM    Triglyceride 108 11/16/2017 04:44 AM          Basic Metabolic Panel (70/81/0890 9:50 AM)  Basic Metabolic Panel (35/42/6129 9:50 AM)   Component Value Ref Range   Potassium 4.5 3.5 - 5.5 mmol/L   SODIUM 139 133 - 145 mmol/L   CHLORIDE 101 98 - 110 mmol/L   Glucose 106 (H) 70 - 99 mg/dL   CALCIUM 9.7 8.4 - 10.5 mg/dL   BUN 31 (H) 6 - 22 mg/dL   CREATININE 1.6 (H) 0.8 - 1.4 mg/dL   CO2 20 20 - 32 mmol/L   eGFR African American 36.5 (L) >60.0   eGFR Non African American 30.1 (L) >60.0   ANION GAP 17.9 mmol/L     Basic Metabolic Panel (29/12/4052 9:50 AM)   Specimen Performing Laboratory   Blood [facility]    [address] [place] [address]      Basic Metabolic Panel (23/69/8547 9:50 AM)   Narrative   Estimated GFR results are reported in mL/min/1.73 sq.m. by the MDRD equation.        This eGFR is validated for stable chronic renal failure patients.  This equation is unreliable in acute illness or patients with normal renal function.             Back to top of Lab Results    CBC (12/06/2017 9:50 AM)  CBC (12/06/2017 9:50 AM)   Component Value Ref Range   WBC x 10*3 12.4 (H) 4.0 - 11.0 K/uL   RBC x 10^6 3. 18 (L) 3.80 - 5.20 M/uL   HGB 9.2 (L) 11.7 - 16.1 g/dL   HCT 29.8 (L) 35.1 - 48.3 %   MCV 94 80 - 95 fL   MCH 29 26 - 34 pg   MCHC 31 (L) 32 - 36 g/dL   RDW 16.0 10.0 - 16.0 %   Platelet 152 640 - 388 K/uL   MPV 10.0 6.0 - 10.8 fL     CBC (12/06/2017 9:50 AM)   Specimen Performing Laboratory   Blood [facility]   [address] [place] [address] Main    Results   MRI BRAIN WO CONT (Accession [id number]) (Order [id number])   Allergies      Unspecified: Ace Inhibitors; Shellfish Derived   Result Information   Status Provider Status      Final result (Exam End: 11/16/2017 11:57 AM) Open    Study Result   EXAMINATION: MRI brain without contrast     MEDICAL HISTORY: Focal neurologic deficit. .      TECHNIQUE: Multisequence, multiplanar MRI of the brain without contrast.     COMPARISONS: CT head dated 9/30/2017. MRI brain dated 9/1/2017.     FINDINGS:      No restricted diffusion to suggest acute demyelination or ischemia.     No focal mass, midline shift or acute intracranial hemorrhage.     Scattered subcortical, confluent periventricular and coalescent deep T2 and  FLAIR white matter hyperintensities, nonspecific though most consistent with  advanced small vessel ischemic change. Nonacute right caudate lacunar infarct  unchanged.     Several scattered punctate and small areas of magnetic susceptibility artifact  predominantly involving the temporal regions, most consistent with hemosiderin  deposition related to previous hemorrhage or cerebral amyloid angiopathy.  This  is unchanged when compared with the previous MRI.     Prominence of the ventricles, cisterns and other CSF containing spaces due to  parenchymal volume loss.     Flow voids within the skull base grossly unremarkable.     Opacification of the right sphenoid sinus and a few posterior ethmoid air cells.     IMPRESSION  IMPRESSION:  1. No focal mass,  midline shift or acute intracranial hemorrhage. 2. No evidence of acute ischemia. 3. Diffuse parenchymal volume loss and several patterns of nonacute ischemic  change. All lab results and radiological studies were reviewed and discussed with the patient. ASSESSMENT/PLAN:      ICD-10-CM ICD-9-CM    1. Essential hypertension P23 990.8 METABOLIC PANEL, BASIC  Continue amlodipine   2. Bilateral hearing loss, unspecified hearing loss type H91.93 389.9 REFERRAL TO AUDIOLOGY   3. Bilateral edema of lower extremity R60.0 782. 3 Compression stockings ordered again  Elevate legs   4. Iron deficiency anemia, unspecified iron deficiency anemia type D50.9 280.9 CBC WITH AUTOMATED DIFF   5. Medicare annual wellness visit, subsequent Z00.00 V70.0 Completed this visit   6. Chronic kidney disease, unspecified CKD stage Y20.2 864.1 METABOLIC PANEL, BASIC   7. Major neurocognitive disorder F03.90 294.20 Information provided  encouraged to have medication assistance every day  Discuss Rx next visit  Education provided   \Patient verbalized understanding and agreement with the plan. Patient was given an after-visit summary. Follow-up Disposition:  Return in about 4 weeks (around 6/27/2018), or if symptoms worsen or fail to improve, for HTN CKD, anemia, DEMENTIA. or sooner if worsening symptoms. More than 50% of this 60 min visit was spent counseling the patient face to face about etiology and treatment of health conditions outlined in assessment and plan. Gregory Cohen M.D. [address], [address] [place] [address], [address] [place] [address] [phone]  Fax - [place] [phone] VISIT NOTE    Addy Treadwell is a 80 y.o. female and presents for annual Medicare Wellness Visit. Problem List: Reviewed with patient and discussed risk factors.     Patient Active Problem List   Diagnosis Code    Vasovagal syncope R55    CKD (chronic kidney disease) N18.9    Bilateral edema of lower extremity R60.0    Iron deficiency anemia D50.9    GERD (gastroesophageal reflux disease) K21.9    Congestive heart failure (CHF) (HCC) I50.9    History of ETOH abuse Z87.898    Hypertension I10    Nephrolithiasis N20.0    Supraventricular bigeminy R00.8    Major neurocognitive disorder F03.90       Current medical providers:  Patient Care Team:  Chace Carpenter MD as PCP - General (Internal Medicine)    PSH: Reviewed with patient  Past Surgical History:   Procedure Laterality Date    HX GI      COLECTOMY    HX GI      COLONOSCOPY    HX HYSTERECTOMY      HYSTERECTOMY        SH: Reviewed with patient  Social History   Substance Use Topics    Smoking status: Never Smoker    Smokeless tobacco: Never Used    Alcohol use No      Comment: not since in rehab       FH: Reviewed with patient  Family History   Problem Relation Age of Onset    No Known Problems Mother     No Known Problems Father        Medications/Allergies: Reviewed with patient  Current Outpatient Prescriptions on File Prior to Visit   Medication Sig Dispense Refill    amLODIPine (NORVASC) 10 mg tablet Take 1 Tab by mouth daily for 180 days. 90 Tab 1    ergocalciferol (ERGOCALCIFEROL) 50,000 unit capsule Take 1 Cap by mouth every seven (7) days. (Patient taking differently: Take 50,000 Units by mouth two (2) times a week.) 4 Cap 3    mirtazapine (REMERON) 7.5 mg tablet Take 1 Tab by mouth nightly. 30 Tab 0    ferrous sulfate (IRON, FERROUS SULFATE,) 325 mg (65 mg iron) tablet Take 1 Tab by mouth two (2) times a day. 60 Tab 3    ascorbic acid, vitamin C, (VITAMIN C) 250 mg tablet Take 1 Tab by mouth two (2) times a day. 60 Tab 3     No current facility-administered medications on file prior to visit.        Allergies   Allergen Reactions    Ace Inhibitors Angioedema    Shellfish Derived Hives and Swelling       Objective:  Visit Vitals    /70    Pulse (!) 54    Temp 97.1 °F (36.2 °C) (Oral)    Resp 16    Ht 5' 2\" (1.575 m)    Wt 115 lb (52.2 kg)    SpO2 96%  Comment: ra    BMI 21.03 kg/m2    Body mass index is 21.03 kg/(m^2). Physical Exam: sitting comfortably in no acute distress. Assessment of cognitive impairment: Alert and oriented x 3. MOCA: 20/30 4/5 orientation, 2/3 identification, 2/6 attention, 1/3 language, 1/2 abstraction, 4/5 delayed recall, 5/6 orientation      Depression Screen:   PHQ over the last two weeks 4/30/2018   Little interest or pleasure in doing things Not at all   Feeling down, depressed or hopeless Not at all   Total Score PHQ 2 0       Fall Risk Assessment:    Fall Risk Assessment, last 12 mths 4/30/2018   Able to walk? Yes   Fall in past 12 months? No       Functional Ability:   Does the patient exhibit a steady gait? With cane   How long did it take the patient to get up and walk from a sitting position? ~20 seconds   Is the patient self reliant?  (ie can do own laundry, meals, household chores)  yes     Does the patient handle his/her own medications? Yes but not effectively     Does the patient handle his/her own money? no     Is the patients home safe (ie good lighting, handrails on stairs and bath, etc.)? no     Did you notice or did patient express any hearing difficulties? yes     Did you notice or did patient express any vision difficulties? Yes, wearing glasses     Were distance and reading eye charts used?   yes       Advance Care Planning:   Patient was offered the opportunity to discuss advance care planning:  yes     Does patient have an Advance Directive:  no   If no, did you provide information on Caring Connections?  no       Plan:    Compression stockings  Handout on dementia given  RTC in 4 weeks  Orders Placed This Encounter    METABOLIC PANEL, BASIC    CBC WITH AUTOMATED DIFF    REFERRAL TO [facility]   Topic Date Due    DTaP/Tdap/Td series (1 - Tdap) 06/28/1955    ZOSTER VACCINE AGE 60>  04/28/1994    GLAUCOMA SCREENING Q2Y  06/28/1999    Bone Densitometry (Dexa) Screening  06/28/1999    Pneumococcal 65+ Low/Medium Risk (2 of 2 - PCV13) 10/25/2013    Influenza Age 9 to Adult  08/01/2018    [address] [place] [address]  05/31/2019       *Patient verbalized understanding and agreement with the plan. A copy of the After Visit Summary with personalized health plan was given to the patient today. Lindsay Neal

## 2018-05-30 NOTE — PROGRESS NOTES
Chief Complaint   Patient presents with    Hypertension    Memory Loss     1. Have you been to the ER, urgent care clinic since your last visit? Hospitalized since your last visit? No    2. Have you seen or consulted any other health care providers outside of the [address] [place] [address] since your last visit? Include any pap smears or colon screening.  No

## 2018-05-30 NOTE — PATIENT INSTRUCTIONS
COMPLETE ADVANCED CARE PLANNING FORM AND BRING BACK NEXT VISIT    We are sending a referral to AUDIOLOGY FOR EVALUATION OF HEARING AIDS . You should be called about this in 1-2 weeks. If no word in 2 weeks please give us a call to follow up    [address] 10 MG    GET COMPRESSION STOCKINGS    RETURN TO CLINIC IN 4 WEEKS FOR FOLLOW UP OF BLOOD PRESSURE    GET BLOOD WORK 1 WEEK BEFORE NEXT VISIT, NO NEED TO FAST, NO NEED TO SCHEDULE AN APPOINTMENT, LAB OPENS 8:30AM TO 4 PM EVERY DAY           Learning About the Symptoms of Dementia  What is dementia? We all forget things as we get older. Many older people have a slight loss of memory that does not affect their daily lives. But memory loss that gets worse may mean that you have dementia. Dementia is a loss of mental skills that affects your daily life. It can cause problems with memory, problem-solving, and learning. It also can cause problems with thinking and planning. Dementia usually gets worse over time. But how quickly it gets worse is different for each person. Some people stay the same for years. Others lose skills quickly. Your chances of having dementia rise as you get older. But this doesn't mean that everyone will get it. What causes dementia? Dementia is caused by damage to or changes in the brain. Alzheimer's disease is the most common kind of dementia. Alzheimer's causes a steady loss of memory and how well you can speak, think, and do your daily activities. The second most common kind of dementia is caused by a series of strokes. It's called vascular dementia. It often gets worse step by step. With each new stroke, more mental skills are lost.  Serious head injuries in the past can sometimes lead to dementia, too. What are the symptoms? Usually the first symptom of dementia is memory loss. Often the person who has the memory problem doesn't notice it, but family and friends do.   People who have dementia may have increasing trouble with:  · Recalling recent events. They may forget appointments or lose objects. · Recognizing people and places. · Keeping up with conversations and activity. · Finding their way around familiar places, or driving to and from places they know well. · Keeping up personal care such as grooming or bathing. · Planning and carrying out routine tasks. They may have trouble following a recipe or writing a letter or email. What are some next steps? If you are worried about memory loss, see your doctor soon. He or she can do a physical exam and ask questions about recent and past illnesses and life events. Think about bringing someone to your doctor's appointment to take notes for you. Your doctor may suggest a series of tests to measure memory changes over time. These tests give the doctor an overall picture of how well your brain is working. The doctor can use the results to decide the best treatment program and help make your life safer and easier. It is important to know that memory loss can be caused by things other than dementia. These things can include health problems such as depression, a low amount of thyroid hormone, and some infections. When those things are treated, your ability to remember can get better. Follow-up care is a key part of your treatment and safety. Be sure to make and go to all appointments, and call your doctor if you are having problems. It's also a good idea to know your test results and keep a list of the medicines you take. Where can you learn more? Go to http://pino-naomi.info/. Enter 035 [id number] [id number] in the search box to learn more about \"Learning About the Symptoms of Dementia. \"  Current as of: May 12, 2017  Content Version: 11.4  © 9045-7429 Healthwise, Incorporated. Care instructions adapted under license by [employer] (which disclaims liability or warranty for this information).  If you have questions about a medical condition or this instruction, always ask your healthcare professional. [place] [address] any warranty or liability for your use of this information.

## 2018-06-27 ENCOUNTER — HOSPITAL ENCOUNTER (OUTPATIENT)
Dept: LAB | Age: 83
Discharge: HOME | End: 2018-06-27
Payer: MEDICARE

## 2018-06-27 ENCOUNTER — OFFICE VISIT (OUTPATIENT)
Dept: FAMILY MEDICINE CLINIC | Age: 83
End: 2018-06-27

## 2018-06-27 VITALS
DIASTOLIC BLOOD PRESSURE: 68 MMHG | BODY MASS INDEX: 21.49 KG/M2 | SYSTOLIC BLOOD PRESSURE: 142 MMHG | HEART RATE: 69 BPM | HEIGHT: 62 IN | WEIGHT: 116.8 LBS | TEMPERATURE: 96.5 F | RESPIRATION RATE: 14 BRPM

## 2018-06-27 DIAGNOSIS — D50.9 IRON DEFICIENCY ANEMIA, UNSPECIFIED IRON DEFICIENCY ANEMIA TYPE: ICD-10-CM

## 2018-06-27 DIAGNOSIS — N18.9 CHRONIC KIDNEY DISEASE, UNSPECIFIED CKD STAGE: ICD-10-CM

## 2018-06-27 DIAGNOSIS — I10 ESSENTIAL HYPERTENSION: Primary | ICD-10-CM

## 2018-06-27 DIAGNOSIS — I10 ESSENTIAL HYPERTENSION: ICD-10-CM

## 2018-06-27 LAB
ANION GAP SERPL CALC-SCNC: 9 MMOL/L (ref 3–18)
BASOPHILS # BLD: 0 K/UL (ref 0–0.06)
BASOPHILS NFR BLD: 0 % (ref 0–2)
BUN SERPL-MCNC: 28 MG/DL (ref 7–18)
BUN/CREAT SERPL: 20 (ref 12–20)
CALCIUM SERPL-MCNC: 9.1 MG/DL (ref 8.5–10.1)
CHLORIDE SERPL-SCNC: 107 MMOL/L (ref 100–108)
CO2 SERPL-SCNC: 26 MMOL/L (ref 21–32)
CREAT SERPL-MCNC: 1.38 MG/DL (ref 0.6–1.3)
DIFFERENTIAL METHOD BLD: ABNORMAL
EOSINOPHIL # BLD: 0.2 K/UL (ref 0–0.4)
EOSINOPHIL NFR BLD: 2 % (ref 0–5)
ERYTHROCYTE [DISTWIDTH] IN BLOOD BY AUTOMATED COUNT: 14 % (ref 11.6–14.5)
GLUCOSE SERPL-MCNC: 85 MG/DL (ref 74–99)
HCT VFR BLD AUTO: 31.9 % (ref 35–45)
HGB BLD-MCNC: 10.4 G/DL (ref 12–16)
LYMPHOCYTES # BLD: 2.1 K/UL (ref 0.9–3.6)
LYMPHOCYTES NFR BLD: 22 % (ref 21–52)
MCH RBC QN AUTO: 30.1 PG (ref 24–34)
MCHC RBC AUTO-ENTMCNC: 32.6 G/DL (ref 31–37)
MCV RBC AUTO: 92.2 FL (ref 74–97)
MONOCYTES # BLD: 0.7 K/UL (ref 0.05–1.2)
MONOCYTES NFR BLD: 7 % (ref 3–10)
NEUTS SEG # BLD: 6.4 K/UL (ref 1.8–8)
NEUTS SEG NFR BLD: 69 % (ref 40–73)
PLATELET # BLD AUTO: 210 K/UL (ref 135–420)
PMV BLD AUTO: 10.1 FL (ref 9.2–11.8)
POTASSIUM SERPL-SCNC: 3.7 MMOL/L (ref 3.5–5.5)
RBC # BLD AUTO: 3.46 M/UL (ref 4.2–5.3)
SODIUM SERPL-SCNC: 142 MMOL/L (ref 136–145)
WBC # BLD AUTO: 9.3 K/UL (ref 4.6–13.2)

## 2018-06-27 PROCEDURE — 36415 COLL VENOUS BLD VENIPUNCTURE: CPT | Performed by: INTERNAL MEDICINE

## 2018-06-27 PROCEDURE — 80048 BASIC METABOLIC PNL TOTAL CA: CPT | Performed by: INTERNAL MEDICINE

## 2018-06-27 PROCEDURE — 85025 COMPLETE CBC W/AUTO DIFF WBC: CPT | Performed by: INTERNAL MEDICINE

## 2018-06-27 RX ORDER — LOSARTAN POTASSIUM 25 MG/1
25 TABLET ORAL DAILY
Qty: 90 TAB | Refills: 0 | Status: SHIPPED | OUTPATIENT
Start: 2018-06-27 | End: 2018-10-03 | Stop reason: SDUPTHER

## 2018-06-27 NOTE — MR AVS SNAPSHOT
[facility] 
 
 
 [address] [facility] [address] [address] 
[phone] Patient: Agatha Howe MRN: [id number] :1934 Visit Information Date & Time Provider Department Dept. Phone Encounter #  
 2018  4:00 PM Gary Mckinney MD [address] [facility] [phone] [id number] Follow-up Instructions Return in about 5 weeks (around 2018) for HTN. Your Appointments 2018  4:00 PM  
ROUTINE CARE with Gary Mckinney MD  
[address] [facility] ([facility]) Appt Note: Return in about 4 weeks (around 2018), or if symptoms worsen or fail to improve, for HTN CKD, anemia, DEMENTIA [address] [place] [address] [address] [address]  
[phone]  
  
   
 [address] [place] [address] [address] Upcoming Health Maintenance Date Due DTaP/Tdap/Td series (1 - Tdap) 1955 ZOSTER VACCINE AGE 60> 1994 GLAUCOMA SCREENING Q2Y 1999 Bone Densitometry (Dexa) Screening 1999 Pneumococcal 65+ Low/Medium Risk (2 of 2 - PCV13) 10/25/2013 Influenza Age 5 to Adult 2018 MEDICARE YEARLY EXAM 2019 Allergies as of 2018  Review Complete On: 2018 By: Dewey Raines LPN Severity Noted Reaction Type Reactions Ace Inhibitors  2017    Angioedema Shellfish Derived  2018    Hives, Swelling Current Immunizations  Never Reviewed Name Date Influenza Vaccine 10/25/2014 12:00 AM  
 Pneumococcal Polysaccharide (PPSV-23) 10/25/2012 12:00 AM  
 Tetanus Toxoid 2013 12:00 AM  
  
 Not reviewed this visit You Were Diagnosed With   
  
 Codes Comments Essential hypertension    -  Primary ICD-10-CM: I10 
ICD-9-CM: 401.9 Vitals BP Pulse Temp Resp Height(growth percentile) Weight(growth percentile) 142/68 69 96.5 °F (35.8 °C) (Oral) 14 5' 2\" (1.575 m) 116 lb 12.8 oz (53 kg) BMI OB Status Smoking Status 21.36 kg/m2 Hysterectomy Never Smoker Vitals History BMI and BSA Data Body Mass Index Body Surface Area  
 21.36 kg/m 2 1.52 m 2 Preferred Pharmacy Pharmacy Name Phone [facility] [phone] Your Updated Medication List  
  
   
This list is accurate as of 6/27/18  3:50 PM.  Always use your most recent med list. amLODIPine 10 mg tablet Commonly known as:  Odalis Corona Take 1 Tab by mouth daily for 180 days. ascorbic acid (vitamin C) 250 mg tablet Commonly known as:  VITAMIN C Take 1 Tab by mouth two (2) times a day. ergocalciferol 50,000 unit capsule Commonly known as:  ERGOCALCIFEROL Take 1 Cap by mouth every seven (7) days. ferrous sulfate 325 mg (65 mg iron) tablet Commonly known as:  Iron (Ferrous Sulfate) Take 1 Tab by mouth two (2) times a day. losartan 25 mg tablet Commonly known as:  COZAAR Take 1 Tab by mouth daily for 90 days. mirtazapine 7.5 mg tablet Commonly known as:  Andriy Font Take 1 Tab by mouth nightly. Prescriptions Sent to Pharmacy Refills  
 losartan (COZAAR) 25 mg tablet 0 Sig: Take 1 Tab by mouth daily for 90 days. Class: Normal  
 Pharmacy: [facility] , [address] [place] [address] #: [phone] Route: Oral  
  
Follow-up Instructions Return in about 5 weeks (around 8/1/2018) for HTN. Patient Instructions Rocio3 Angeles Orona FORM BACK NEXT VISIT [address] [facility] PAPI MIRANDA [address] [place] [address], [address] Phone: [phone] CALL ABOVE TO SCHEDULE APPOINTMENT FOR HEARING EVALUATION 
 
FOR BLOOD PRESSURE: 
Start taking losartan 25 mg daily , IF YOU DONT GET THESE MEDICINES IN 5 DAYS CALL CLINIC TO DISCUSS Return for blood work in 2-3 weeks Continue amlodipine 10 mg daily Max salt 2 grams per 24 hrs Try to walk 30 minutes a day DASH Diet: Care Instructions Your Care Instructions The DASH diet is an eating plan that can help lower your blood pressure. DASH stands for Dietary Approaches to Stop Hypertension. Hypertension is high blood pressure. The DASH diet focuses on eating foods that are high in calcium, potassium, and magnesium. These nutrients can lower blood pressure. The foods that are highest in these nutrients are fruits, vegetables, low-fat dairy products, nuts, seeds, and legumes. But taking calcium, potassium, and magnesium supplements instead of eating foods that are high in those nutrients does not have the same effect. The DASH diet also includes whole grains, fish, and poultry. The DASH diet is one of several lifestyle changes your doctor may recommend to lower your high blood pressure. Your doctor may also want you to decrease the amount of sodium in your diet. Lowering sodium while following the DASH diet can lower blood pressure even further than just the DASH diet alone. Follow-up care is a key part of your treatment and safety. Be sure to make and go to all appointments, and call your doctor if you are having problems. It's also a good idea to know your test results and keep a list of the medicines you take. How can you care for yourself at home? Following the DASH diet · Eat 4 to 5 servings of fruit each day. A serving is 1 medium-sized piece of fruit, ½ cup chopped or canned fruit, 1/4 cup dried fruit, or 4 ounces (½ cup) of fruit juice. Choose fruit more often than fruit juice. · Eat 4 to 5 servings of vegetables each day. A serving is 1 cup of lettuce or raw leafy vegetables, ½ cup of chopped or cooked vegetables, or 4 ounces (½ cup) of vegetable juice. Choose vegetables more often than vegetable juice. · Get 2 to 3 servings of low-fat and fat-free dairy each day.  A serving is 8 ounces of milk, 1 cup of yogurt, or 1 ½ ounces of cheese. · Eat 6 to 8 servings of grains each day. A serving is 1 slice of bread, 1 ounce of dry cereal, or ½ cup of cooked rice, pasta, or cooked cereal. Try to choose whole-grain products as much as possible. · Limit lean meat, poultry, and fish to 2 servings each day. A serving is 3 ounces, about the size of a deck of cards. · Eat 4 to 5 servings of nuts, seeds, and legumes (cooked dried beans, lentils, and split peas) each week. A serving is 1/3 cup of nuts, 2 tablespoons of seeds, or ½ cup of cooked beans or peas. · Limit fats and oils to 2 to 3 servings each day. A serving is 1 teaspoon of vegetable oil or 2 tablespoons of salad dressing. · Limit sweets and added sugars to 5 servings or less a week. A serving is 1 tablespoon jelly or jam, ½ cup sorbet, or 1 cup of lemonade. · Eat less than 2,300 milligrams (mg) of sodium a day. If you limit your sodium to 1,500 mg a day, you can lower your blood pressure even more. Tips for success · Start small. Do not try to make dramatic changes to your diet all at once. You might feel that you are missing out on your favorite foods and then be more likely to not follow the plan. Make small changes, and stick with them. Once those changes become habit, add a few more changes. · Try some of the following: ¨ Make it a goal to eat a fruit or vegetable at every meal and at snacks. This will make it easy to get the recommended amount of fruits and vegetables each day. ¨ Try yogurt topped with fruit and nuts for a snack or healthy dessert. ¨ Add lettuce, tomato, cucumber, and onion to sandwiches. ¨ Combine a ready-made pizza crust with low-fat mozzarella cheese and lots of vegetable toppings. Try using tomatoes, squash, spinach, broccoli, carrots, cauliflower, and onions. ¨ Have a variety of cut-up vegetables with a low-fat dip as an appetizer instead of chips and dip. ¨ Sprinkle sunflower seeds or chopped almonds over salads. Or try adding chopped walnuts or almonds to cooked vegetables. ¨ Try some vegetarian meals using beans and peas. Add garbanzo or kidney beans to salads. Make burritos and tacos with mashed grant beans or black beans. Where can you learn more? Go to http://pino-naomi.info/. Enter F506 in the search box to learn more about \"DASH Diet: Care Instructions. \" Current as of: September 21, 2016 Content Version: 11.4 © 7377-6908 [employer] Interactive. Care instructions adapted under license by [employer] (which disclaims liability or warranty for this information). If you have questions about a medical condition or this instruction, always ask your healthcare professional. [address] any warranty or liability for your use of this information. Introducing [facility] [place] [facility]! [employer] introduces [employer] patient portal. Now you can access parts of your medical record, email your doctor's office, and request medication refills online. 1. In your internet browser, go to [email or web address] 2. Click on the First Time User? Click Here link in the Sign In box. You will see the New Member Sign Up page. 3. Enter your [employer] Access Code exactly as it appears below. You will not need to use this code after youve completed the sign-up process. If you do not sign up before the expiration date, you must request a new code. · [employer] Access Code: [email or web address] Expires: 9/25/2018  3:50 PM 
 
4. Enter the last four digits of your Social Security Number (xxxx) and Date of Birth (mm/dd/yyyy) as indicated and click Submit. You will be taken to the next sign-up page. 5. Create a [employer] ID. This will be your [employer] login ID and cannot be changed, so think of one that is secure and easy to remember. 6. Create a [employer] password. You can change your password at any time. 7. Enter your Password Reset Question and Answer. This can be used at a later time if you forget your password. 8. Enter your e-mail address. You will receive e-mail notification when new information is available in [address] 9. Click Sign Up. You can now view and download portions of your medical record. 10. Click the Download Summary menu link to download a portable copy of your medical information. If you have questions, please visit the Frequently Asked Questions section of the [employer] website. Remember, [employer] is NOT to be used for urgent needs. For medical emergencies, dial 911. Now available from your iPhone and Android! Please provide this summary of care documentation to your next provider. Your primary care clinician is listed as Caitlin Kohli. If you have any questions after today's visit, please call [phone].

## 2018-06-27 NOTE — PATIENT INSTRUCTIONS
[facility] ADVANCED CARE PLANNING FORM BACK NEXT VISIT    [facility]  [address]   [address]   [address] [facility] [address]   [place], [address]  Phone: [phone]  CALL ABOVE TO SCHEDULE APPOINTMENT FOR HEARING EVALUATION    FOR BLOOD PRESSURE:  Start taking losartan 25 mg daily , IF YOU DONT GET THESE MEDICINES IN 5 DAYS CALL CLINIC TO DISCUSS  Return for blood work in 2-3 weeks  Continue amlodipine 10 mg daily  Max salt 2 grams per 24 hrs  Try to walk 30 minutes a day         DASH Diet: Care Instructions  Your Care Instructions    The DASH diet is an eating plan that can help lower your blood pressure. DASH stands for Dietary Approaches to Stop Hypertension. Hypertension is high blood pressure. The DASH diet focuses on eating foods that are high in calcium, potassium, and magnesium. These nutrients can lower blood pressure. The foods that are highest in these nutrients are fruits, vegetables, low-fat dairy products, nuts, seeds, and legumes. But taking calcium, potassium, and magnesium supplements instead of eating foods that are high in those nutrients does not have the same effect. The DASH diet also includes whole grains, fish, and poultry. The DASH diet is one of several lifestyle changes your doctor may recommend to lower your high blood pressure. Your doctor may also want you to decrease the amount of sodium in your diet. Lowering sodium while following the DASH diet can lower blood pressure even further than just the DASH diet alone. Follow-up care is a key part of your treatment and safety. Be sure to make and go to all appointments, and call your doctor if you are having problems. It's also a good idea to know your test results and keep a list of the medicines you take. How can you care for yourself at home? Following the DASH diet  · Eat 4 to 5 servings of fruit each day.  A serving is 1 medium-sized piece of fruit, ½ cup chopped or canned fruit, 1/4 cup dried fruit, or 4 ounces (½ cup) of fruit juice. Choose fruit more often than fruit juice. · Eat 4 to 5 servings of vegetables each day. A serving is 1 cup of lettuce or raw leafy vegetables, ½ cup of chopped or cooked vegetables, or 4 ounces (½ cup) of vegetable juice. Choose vegetables more often than vegetable juice. · Get 2 to 3 servings of low-fat and fat-free dairy each day. A serving is 8 ounces of milk, 1 cup of yogurt, or 1 ½ ounces of cheese. · Eat 6 to 8 servings of grains each day. A serving is 1 slice of bread, 1 ounce of dry cereal, or ½ cup of cooked rice, pasta, or cooked cereal. Try to choose whole-grain products as much as possible. · Limit lean meat, poultry, and fish to 2 servings each day. A serving is 3 ounces, about the size of a deck of cards. · Eat 4 to 5 servings of nuts, seeds, and legumes (cooked dried beans, lentils, and split peas) each week. A serving is 1/3 cup of nuts, 2 tablespoons of seeds, or ½ cup of cooked beans or peas. · Limit fats and oils to 2 to 3 servings each day. A serving is 1 teaspoon of vegetable oil or 2 tablespoons of salad dressing. · Limit sweets and added sugars to 5 servings or less a week. A serving is 1 tablespoon jelly or jam, ½ cup sorbet, or 1 cup of lemonade. · Eat less than 2,300 milligrams (mg) of sodium a day. If you limit your sodium to 1,500 mg a day, you can lower your blood pressure even more. Tips for success  · Start small. Do not try to make dramatic changes to your diet all at once. You might feel that you are missing out on your favorite foods and then be more likely to not follow the plan. Make small changes, and stick with them. Once those changes become habit, add a few more changes. · Try some of the following:  ¨ Make it a goal to eat a fruit or vegetable at every meal and at snacks. This will make it easy to get the recommended amount of fruits and vegetables each day. ¨ Try yogurt topped with fruit and nuts for a snack or healthy dessert.   ¨ Add lettuce, tomato, cucumber, and onion to sandwiches. ¨ Combine a ready-made pizza crust with low-fat mozzarella cheese and lots of vegetable toppings. Try using tomatoes, squash, spinach, broccoli, carrots, cauliflower, and onions. ¨ Have a variety of cut-up vegetables with a low-fat dip as an appetizer instead of chips and dip. ¨ Sprinkle sunflower seeds or chopped almonds over salads. Or try adding chopped walnuts or almonds to cooked vegetables. ¨ Try some vegetarian meals using beans and peas. Add garbanzo or kidney beans to salads. Make burritos and tacos with mashed grant beans or black beans. Where can you learn more? Go to http://pino-naomi.info/. Enter F029 in the search box to learn more about \"DASH Diet: Care Instructions. \"  Current as of: September 21, 2016  Content Version: 11.4  © 8130-5770 Healthwise, [employer]. Care instructions adapted under license by [employer] (which disclaims liability or warranty for this information). If you have questions about a medical condition or this instruction, always ask your healthcare professional. [place] [address] any warranty or liability for your use of this information.

## 2018-06-27 NOTE — PROGRESS NOTES
Chief Complaint   Patient presents with    Hypertension    Chronic Kidney Disease       Pt preferred language for health care discussion is English. Is someone accompanying this pt? friend    Is the patient using any DME equipment during [address]? no    Pt currently taking Antiplatelet therapy? no    Depression Screening:  PHQ over the last two weeks 6/27/2018 4/30/2018 4/30/2018   Little interest or pleasure in doing things Not at all Not at all Not at all   Feeling down, depressed or hopeless Not at all Not at all Not at all   Total Score PHQ 2 0 0 0       Learning Assessment:  Learning Assessment 6/27/2018   PRIMARY LEARNER Patient   HIGHEST LEVEL OF EDUCATION - PRIMARY LEARNER  DID NOT GRADUATE HIGH SCHOOL   PRIMARY LANGUAGE ENGLISH   LEARNER PREFERENCE PRIMARY READING   ANSWERED BY Nils Jameson   RELATIONSHIP SELF       Abuse Screening:  Abuse Screening Questionnaire 6/27/2018   Do you ever feel afraid of your partner? N   Are you in a relationship with someone who physically or mentally threatens you? N   Is it safe for you to go home? Y       Fall Risk  Fall Risk Assessment, last 12 mths 6/27/2018 4/30/2018   Able to walk? Yes Yes   Fall in past 12 months? No No       Health Maintenance reviewed, discussed with patient and ordered per Provider. Health Maintenance Due   Topic Date Due    DTaP/Tdap/Td series (1 - Tdap) [birth year]28/1955    ZOSTER VACCINE AGE 60>  04/28/1994    GLAUCOMA SCREENING Q2Y  06/28/1999    Bone Densitometry (Dexa) Screening  06/28/1999    Pneumococcal 65+ Low/Medium Risk (2 of 2 - PCV13) 10/25/2013   . Any and all required SACHIN forms filled out by patient and faxed, confirmation received. Coordination of Care:  1. Have you been to the ER, urgent care clinic since your last visit? Hospitalized since your last visit? no    2. Have you seen or consulted any other health care providers outside of the [facility] since your last visit?  Include any pap smears or colon screening.  no

## 2018-06-27 NOTE — PROGRESS NOTES
Internal Medicine Follow Up Visit Note    Chief Complaint   Patient presents with    Hypertension       HPI:  Cindy Alvarado is a 80 y.o.  female with history significant for dementia and HTN is here for the above complaint(s). Hypertension: Today BP is uncontrolled. Taking amlodipine 10 mg daily. No side effects from medications. Medication good compliance. Denies headache, lightheadedness, dizziness, vision changes, chest pain, rapid/irregular heart rate, shortness of breath, cough, abdominal pain, leg swelling, leg pain. Current Outpatient Prescriptions   Medication Sig    losartan (COZAAR) 25 mg tablet Take 1 Tab by mouth daily for 90 days.  amLODIPine (NORVASC) 10 mg tablet Take 1 Tab by mouth daily for 180 days.  ergocalciferol (ERGOCALCIFEROL) 50,000 unit capsule Take 1 Cap by mouth every seven (7) days. (Patient taking differently: Take 50,000 Units by mouth two (2) times a week.)    mirtazapine (REMERON) 7.5 mg tablet Take 1 Tab by mouth nightly.  ferrous sulfate (IRON, FERROUS SULFATE,) 325 mg (65 mg iron) tablet Take 1 Tab by mouth two (2) times a day.  ascorbic acid, vitamin C, (VITAMIN C) 250 mg tablet Take 1 Tab by mouth two (2) times a day. No current facility-administered medications for this visit. Health Maintenance   Topic Date Due    DTaP/Tdap/Td series (1 - Tdap) 06/28/1955    ZOSTER VACCINE AGE 60>  04/28/1994    GLAUCOMA SCREENING Q2Y  06/28/1999    Bone Densitometry (Dexa) Screening  06/28/1999    Pneumococcal 65+ Low/Medium Risk (2 of 2 - PCV13) 10/25/2013    Influenza Age 9 to Adult  08/01/2018    MEDICARE YEARLY EXAM  05/31/2019     Immunization History   Administered Date(s) Administered    Influenza Vaccine 10/25/2014    Pneumococcal Polysaccharide (PPSV-23) 10/25/2012    Tetanus Toxoid 02/20/2013       Allergies and Medications: Reviewed and updated in EMR.      Past Medical History:   Diagnosis Date    Hypertension     Rheumatoid arthritis ([facility] [address]        Family History, Social History, Past Medical History, Surgical History: Reviewed and updated in EMR as appropriate. OBJECTIVE:   Visit Vitals    /68    Pulse 69    Temp 96.5 °F (35.8 °C) (Oral)    Resp 14    Ht 5' 2\" (1.575 m)    Wt 116 lb 12.8 oz (53 kg)    BMI 21.36 kg/m2        BP Readings from Last 3 Encounters:   06/27/18 142/68   05/30/18 158/70   04/30/18 160/74     Wt Readings from Last 3 Encounters:   06/27/18 116 lb 12.8 oz (53 kg)   05/30/18 115 lb (52.2 kg)   04/30/18 116 lb 3.2 oz (52.7 kg)       General: Pleasant elderly woman sitting comfortably in no acute distress  HEENT: Head is atraumatic normo-cephalic. CVS: No appreciable elevated JVD. Heart regular, rate, and rhythm. Audible S1 and S2. No murmurs, rubs or gallops. PULM: Lungs clear to auscultation bilaterally. No wheezes, rales or rhonchi. GI: Positive bowel sounds, soft, nondistended, non-tender. EXT: 2+ radial pulses bilaterally. 2 + non pitting bilateral LE edema up to mid shin. Neuro: Alert and oriented x person. Nursing Notes Reviewed. LABS/RADIOLOGICAL TESTS:      Lab Results   Component Value Date/Time    WBC 9.3 11/17/2017 05:34 AM    HGB 8.4 (L) 11/17/2017 05:34 AM    HCT 25.3 (L) 11/17/2017 05:34 AM    PLATELET 821 43/88/7543 05:34 AM     Lab Results   Component Value Date/Time    Sodium 139 11/17/2017 05:34 AM    Potassium 4.1 11/17/2017 05:34 AM    Chloride 110 (H) 11/17/2017 05:34 AM    CO2 20 (L) 11/17/2017 05:34 AM    Glucose 87 11/17/2017 05:34 AM    BUN 23 11/17/2017 05:34 AM    Creatinine 1.1 11/17/2017 05:34 AM     Lab Results   Component Value Date/Time    Cholesterol, total 171 11/16/2017 04:44 AM    HDL Cholesterol 69 11/16/2017 04:44 AM    LDL, calculated 80 11/16/2017 04:44 AM    Triglyceride 108 11/16/2017 04:44 AM     No results found for: GPT    All lab results and radiological studies were reviewed and discussed with the patient.     ASSESSMENT/PLAN: ICD-10-CM ICD-9-CM    1. Essential hypertension I10 401.9 losartan (COZAAR) 25 mg tablet  Continue norvasc  Low salt diet  RTC in 5 weeks f/u       Requested Prescriptions     Signed Prescriptions Disp Refills    losartan (COZAAR) 25 mg tablet 90 Tab 0     Sig: Take 1 Tab by mouth daily for 90 days. Patient verbalized understanding and agreement with the plan. Patient was given an after-visit summary. Follow-up Disposition:  Return in about 5 weeks (around 8/1/2018) for HTN. or sooner if worsening symptoms. More than 50% of this 25 min visit was spent counseling the patient face to face about etiology and treatment of health conditions outlined in assessment and plan. Surya Du M.D.   [address], [address] [place] [address] [phone]  [place] [phone]

## 2018-06-28 ENCOUNTER — TELEPHONE (OUTPATIENT)
Dept: FAMILY MEDICINE CLINIC | Age: 83
End: 2018-06-28

## 2018-06-28 DIAGNOSIS — I50.32 CHRONIC DIASTOLIC CONGESTIVE HEART FAILURE (HCC): ICD-10-CM

## 2018-06-28 DIAGNOSIS — I50.9 CHRONIC CONGESTIVE HEART FAILURE, UNSPECIFIED HEART FAILURE TYPE (HCC): Primary | ICD-10-CM

## 2018-06-28 NOTE — TELEPHONE ENCOUNTER
LPN please call patient care giver and inform she has some dehydration related kidney injury, needs to drink at least 1.5 liter of water per day, max 2 liters per day. Will recheck kidneys in 2-3 weeks. Gregory Cohen M.D.   [place] [phone]  [place] [phone]

## 2018-07-02 NOTE — TELEPHONE ENCOUNTER
Called and left message with son, no answer. Called and talked to granddaughter emergency contacts Tomy Olszewski and relayed information regarding TAMMY and need to drink more water less coffee. Will come in for blood work 1 week before next appointment 8/1/2018. Dayna Machado M.D.   [place] [phone]  [place] [phone]

## 2018-08-24 ENCOUNTER — IMPORTED ENCOUNTER (OUTPATIENT)
Dept: URBAN - METROPOLITAN AREA CLINIC 1 | Facility: CLINIC | Age: 83
End: 2018-08-24

## 2018-08-24 PROBLEM — H18.413: Noted: 2018-08-24

## 2018-08-24 PROBLEM — H40.013: Noted: 2018-08-24

## 2018-08-24 PROBLEM — Z96.1: Noted: 2018-08-24

## 2018-08-24 PROBLEM — H26.491: Noted: 2018-08-24

## 2018-08-24 PROBLEM — H04.123: Noted: 2018-08-24

## 2018-08-24 PROCEDURE — 92015 DETERMINE REFRACTIVE STATE: CPT

## 2018-08-24 PROCEDURE — 92014 COMPRE OPH EXAM EST PT 1/>: CPT

## 2018-08-24 PROCEDURE — 92133 CPTRZD OPH DX IMG PST SGM ON: CPT

## 2018-08-24 NOTE — PATIENT DISCUSSION
1.  Glaucoma Suspect OU (CD 0.80 OU): No progression by OCT. -family hx. Patient is considered Low Risk. Condition was discussed with patient and patient understands. Will continue to monitor patient for any progression in condition. Patient was advised to call us with any problems questions or concerns. 2.  PCO  OD (Posterior Capsule Opacification)  Observe and consider yag cap when pt feels pco visually significant and visual acuity decreases to appropriate level. 3. Pseudophakia OU - Doing well 4. Dry Eyes OU -The use/continuation of artificial tears were recommended. 5.  Renita Arthur for an appointment in YAG Cap OD only with Dr. Carisa Shafer.

## 2018-10-03 ENCOUNTER — TELEPHONE (OUTPATIENT)
Dept: FAMILY MEDICINE CLINIC | Age: 83
End: 2018-10-03

## 2018-10-03 NOTE — TELEPHONE ENCOUNTER
Left message for patient or caretaker to return my call. Need to inform the caller of information detailed by provider.

## 2018-10-03 NOTE — TELEPHONE ENCOUNTER
LPN please call patient care give inform patient needs new labs before further refills of losartan. If unable to increase water intake will need to discontinue losartan. Needs to reschedule last missed appointment to follow up blood pressure. Sheree Pisano M.D.   [place] [phone]  [place] [phone]

## 2018-10-09 DIAGNOSIS — I10 ESSENTIAL HYPERTENSION: ICD-10-CM

## 2018-10-09 RX ORDER — AMLODIPINE BESYLATE 10 MG/1
TABLET ORAL
Qty: 90 TAB | Refills: 1 | Status: SHIPPED | OUTPATIENT
Start: 2018-10-09 | End: 2020-07-14

## 2018-10-10 NOTE — TELEPHONE ENCOUNTER
Patient caretaker, Alba Bingham, was informed of the information detailed in providers message and verbalized understanding of the information given.

## 2018-10-16 ENCOUNTER — HOSPITAL ENCOUNTER (OUTPATIENT)
Dept: LAB | Age: 83
Discharge: HOME | End: 2018-10-16
Payer: MEDICARE

## 2018-10-16 DIAGNOSIS — I50.9 CHRONIC CONGESTIVE HEART FAILURE, UNSPECIFIED HEART FAILURE TYPE (HCC): ICD-10-CM

## 2018-10-16 LAB
ANION GAP SERPL CALC-SCNC: 8 MMOL/L (ref 3–18)
BUN SERPL-MCNC: 29 MG/DL (ref 7–18)
BUN/CREAT SERPL: 18 (ref 12–20)
CALCIUM SERPL-MCNC: 9 MG/DL (ref 8.5–10.1)
CHLORIDE SERPL-SCNC: 106 MMOL/L (ref 100–108)
CO2 SERPL-SCNC: 28 MMOL/L (ref 21–32)
CREAT SERPL-MCNC: 1.65 MG/DL (ref 0.6–1.3)
GLUCOSE SERPL-MCNC: 94 MG/DL (ref 74–99)
POTASSIUM SERPL-SCNC: 4.3 MMOL/L (ref 3.5–5.5)
SODIUM SERPL-SCNC: 142 MMOL/L (ref 136–145)

## 2018-10-16 PROCEDURE — 80048 BASIC METABOLIC PNL TOTAL CA: CPT | Performed by: INTERNAL MEDICINE

## 2018-10-18 ENCOUNTER — TELEPHONE (OUTPATIENT)
Dept: FAMILY MEDICINE CLINIC | Age: 83
End: 2018-10-18

## 2018-10-18 DIAGNOSIS — I10 ESSENTIAL HYPERTENSION: Primary | ICD-10-CM

## 2018-10-18 DIAGNOSIS — N17.9 AKI (ACUTE KIDNEY INJURY) (HCC): ICD-10-CM

## 2018-10-18 RX ORDER — HYDRALAZINE HYDROCHLORIDE 25 MG/1
25 TABLET, FILM COATED ORAL 3 TIMES DAILY
Qty: 270 TAB | Refills: 0 | Status: SHIPPED | OUTPATIENT
Start: 2018-10-18 | End: 2019-01-16

## 2018-10-19 NOTE — TELEPHONE ENCOUNTER
LPN please call patient and inform of need to d/c losartan and start hydralazine 25 mg three times a day for blood pressure. Kidney damage remains, needs to increase water to 1.5 to 2 Liters per day. Rx sent to [facility] [place]. If any new symptoms after starting medication like palpitations, shortness of breath, chest pain please stop medicine and call MD office. Need to reschedule missed visit form 10/16/2018. Charmayne Plunk M.D.   [address] -   [place] -   [phone]
Patient informed to stop Losartan and to start taking Hydralazine 25mg TID. Patient informed that medication was sent to pharmacy. Patient given instructions/information detailed in providers note and verbalized understanding.
Bladder non-tender and non-distended. Urine clear yellow.

## 2019-06-27 ENCOUNTER — OFFICE VISIT (OUTPATIENT)
Dept: FAMILY MEDICINE CLINIC | Age: 84
End: 2019-06-27

## 2019-06-27 VITALS
TEMPERATURE: 99.1 F | WEIGHT: 120 LBS | BODY MASS INDEX: 22.08 KG/M2 | OXYGEN SATURATION: 100 % | SYSTOLIC BLOOD PRESSURE: 160 MMHG | HEIGHT: 62 IN | HEART RATE: 76 BPM | DIASTOLIC BLOOD PRESSURE: 80 MMHG | RESPIRATION RATE: 16 BRPM

## 2019-06-27 DIAGNOSIS — L97.529 ULCER OF TOE OF LEFT FOOT, UNSPECIFIED ULCER STAGE (HCC): ICD-10-CM

## 2019-06-27 DIAGNOSIS — S91.135A: Primary | ICD-10-CM

## 2019-06-27 DIAGNOSIS — F03.90 MAJOR NEUROCOGNITIVE DISORDER (HCC): ICD-10-CM

## 2019-06-27 DIAGNOSIS — B35.3 TINEA PEDIS OF BOTH FEET: ICD-10-CM

## 2019-06-27 DIAGNOSIS — I10 ESSENTIAL HYPERTENSION: ICD-10-CM

## 2019-06-27 DIAGNOSIS — N18.9 CHRONIC KIDNEY DISEASE, UNSPECIFIED CKD STAGE: ICD-10-CM

## 2019-06-27 DIAGNOSIS — R60.0 BILATERAL EDEMA OF LOWER EXTREMITY: ICD-10-CM

## 2019-06-27 RX ORDER — HYDRALAZINE HYDROCHLORIDE 25 MG/1
25 TABLET, FILM COATED ORAL 3 TIMES DAILY
Qty: 90 TAB | Refills: 2 | Status: SHIPPED | OUTPATIENT
Start: 2019-06-27 | End: 2019-09-25

## 2019-06-27 RX ORDER — CLINDAMYCIN HYDROCHLORIDE 300 MG/1
300 CAPSULE ORAL 3 TIMES DAILY
Qty: 21 CAP | Refills: 0 | Status: SHIPPED | OUTPATIENT
Start: 2019-06-27 | End: 2019-07-04

## 2019-06-27 RX ORDER — KETOCONAZOLE 20 MG/G
CREAM TOPICAL DAILY
Qty: 15 G | Refills: 0 | Status: SHIPPED | OUTPATIENT
Start: 2019-06-27 | End: 2020-04-08

## 2019-06-27 NOTE — PROGRESS NOTES
Patient is here for foot and ankle swelling of both feet. 1. Have you been to the ER, urgent care clinic since your last visit? Hospitalized since your last visit?no    2. Have you seen or consulted any other health care providers outside of the [address] [place] [address] since your last visit? Include any pap smears or colon screening.  no

## 2019-06-27 NOTE — PROGRESS NOTES
[facility] INTERNAL MEDICINE NOTE    Chief Complaint   Patient presents with    Leg Swelling       HPI: Abhijeet Momin is a 80 y.o. female  with PMHx significant for Dementia, CKD, CHF, HTN, leg edema who presents with the above CC(s). Toe Pain: Started a few days ago. Does not recall puncturing or hitting toe on something but states it just started being sore, soreness unchanged. Chronic swelling. No fever, chills, redness of toe noticeable. No pain without movement of toe. Hypertension: Today BP is uncontrolled. Taking losartan 25 mg daily, amlodipine 10 mg daily. No side effects from medications. Medication unclear compliance given patient has memory difficulties. Did not stop losartan as instruction late last year given acute kidney injury related to this medicine in conjunction with poor PO water intake. Denies headache, lightheadedness, dizziness, vision changes, chest pain, rapid/irregular heart rate, shortness of breath, cough, abdominal pain. Past Medical Hx, Family Hx, Social Hx, Surgical Hx, Medications, Allergies: All reviewed and updated in EMR as appropriate. Current Outpatient Medications   Medication Sig    clindamycin (CLEOCIN) 300 mg capsule Take 1 Cap by mouth three (3) times daily for 7 days.  ketoconazole (NIZORAL) 2 % topical cream Apply  to affected area daily.  hydrALAZINE (APRESOLINE) 25 mg tablet Take 1 Tab by mouth three (3) times daily for 90 days.  amLODIPine (NORVASC) 10 mg tablet TAKE 1 TABLET DAILY    mirtazapine (REMERON) 7.5 mg tablet Take 1 Tab by mouth nightly.  ergocalciferol (ERGOCALCIFEROL) 50,000 unit capsule Take 1 Cap by mouth every seven (7) days. (Patient taking differently: Take 50,000 Units by mouth two (2) times a week.)    ferrous sulfate (IRON, FERROUS SULFATE,) 325 mg (65 mg iron) tablet Take 1 Tab by mouth two (2) times a day.     ascorbic acid, vitamin C, (VITAMIN C) 250 mg tablet Take 1 Tab by mouth two (2) times a day.     No current facility-administered medications for this visit. Health Maintenance   Topic Date Due    Shingrix Vaccine Age 49> (1 of 2) 06/28/1984    GLAUCOMA SCREENING Q2Y  06/28/1999    Bone Densitometry (Dexa) Screening  06/28/1999    DTaP/Tdap/Td series (1 - Tdap) 02/21/2013    Pneumococcal 65+ years (2 of 2 - PCV13) 10/25/2013    MEDICARE YEARLY EXAM  05/31/2019    Influenza Age 5 to Adult  08/01/2019       OBJECTIVE:  Vitals:    06/27/19 1708 06/27/19 1729   BP: 181/85 160/80   Pulse: 76    Resp: 16    Temp: 99.1 °F (37.3 °C)    TempSrc: Oral    SpO2: 100%    Weight: 120 lb (54.4 kg)    Height: 5' 2\" (1.575 m)        BP Readings from Last 3 Encounters:   06/27/19 160/80   06/27/18 142/68   05/30/18 158/70     Wt Readings from Last 3 Encounters:   06/27/19 120 lb (54.4 kg)   06/27/18 116 lb 12.8 oz (53 kg)   05/30/18 115 lb (52.2 kg)       General: Pleasant elderly woman in no acute distress  HEENT: Head is atraumatic normo-cephalic. .   CVS:Heart regular, rate, and rhythm. Audible S1 and S2. + Murmur heard throughout precordium. PULM: Lungs clear to auscultation bilaterally. No wheezes, rales or rhonchi. Neuro: Alert and oriented to person. EXT: Significant non pitting edema bilateral LE up to level of ankle. DP and PT pulses 2+ cap refill <3 seconds. 3rd digit on left foot with puncture wound tracts through, minimal bleeding and expressed purulent/serous/calcified substance drainage. Mild erythema without significant warmth or swelling of digit. Soreness to touch. Significant maceration between toes. Significant toe deformaties bilateral feet with elongated thickened yellowed toenails. Nursing Notes Reviewed    ASSESSMENT AND PLAN:    ICD-10-CM ICD-9-CM    1.  Puncture wound of third toe of left foot, initial encounter S91.135A 893.0 XR FOOT LT MIN 3 V      REFERRAL TO PODIATRY  Encouraged to go to urgent care for xray and tdap vaccination given puncture wound of toe, unknown material that caused puncture and unknown if foreign body still present   2. Major neurocognitive disorder F01.50 294.20    3. Essential hypertension I10 401.9 hydrALAZINE (APRESOLINE) 25 mg tablet   4. Chronic kidney disease, unspecified CKD stage N18.9 585.9 RENAL FUNCTION PANEL   5. Bilateral edema of lower extremity R60.0 782. 3 Elevate legs   6. Ulcer of toe of left foot, unspecified ulcer stage (HCC) L97.529 707.15 clindamycin (CLEOCIN) 300 mg capsule   7. Tinea pedis of both feet B35.3 110.4 ketoconazole (NIZORAL) 2 % topical cream      REFERRAL TO PODIATRY       No problem-specific Assessment & Plan notes found for this encounter. Orders Placed This Encounter    XR FOOT LT MIN 3 V    RENAL FUNCTION PANEL    REFERRAL TO PODIATRY    clindamycin (CLEOCIN) 300 mg capsule    ketoconazole (NIZORAL) 2 % topical cream    hydrALAZINE (APRESOLINE) 25 mg tablet       RTC IN 1 WEEK    After visit summary provided to patient    Assessment and plan above discussed with patient, patient voiced understanding and agreement with plan. More than 50% of this 40 min visit was spent face to face counseling the patient about the etiology and treatment options for the above health conditions outlined in assessment and plan    [place] Puckett M.D.   [address]   [facility] -   [phone]

## 2019-06-27 NOTE — PATIENT INSTRUCTIONS
FOR TOE PAIN:  GO TO URGENT CARE TODAY FOR EVALUATION OF PUNCTURE WOUND OF TOE, WILL NEED VACCINE TDAP AND XRAY TO EVALUATE FOR FOREIGN BODY IN TOE  START CLINDAMYCIN THREE TIMES A DAY FOR 7 DAYS FOR ULCER  WE ARE SENDING URGENT REFERRAL TO PODIATRY, IF UNABLE TO GET INTO URGENT CARE GO TO ER/HOSPITAL ED  [facility] FEET WITH WARM MILDLY SOAPY WATER ONCE A DAY AND IN BETWEEN TOES  ELEVATE LEGS DURING THE DAY  USE KETOCONAZOLE CREAM IN BETWEEN TOES TWICE A DAY, USE AFTER WASHING IN BETWEEN TOES      FOR BLOOD PRESSURE  STOP TAKING LOSARTAN  CONTINUE AMLODIPINE DAILY  START HYDRALAZINE 25 MG THREE TIMES A DAY  GET BLOOD WORK FROM [facility] OR [address] [place]

## 2019-11-13 ENCOUNTER — OFFICE VISIT (OUTPATIENT)
Dept: FAMILY MEDICINE CLINIC | Age: 84
End: 2019-11-13

## 2019-11-13 VITALS
SYSTOLIC BLOOD PRESSURE: 150 MMHG | HEART RATE: 73 BPM | DIASTOLIC BLOOD PRESSURE: 90 MMHG | RESPIRATION RATE: 17 BRPM | BODY MASS INDEX: 20.72 KG/M2 | HEIGHT: 62 IN | WEIGHT: 112.6 LBS

## 2019-11-13 DIAGNOSIS — I50.32 CHRONIC DIASTOLIC CONGESTIVE HEART FAILURE (HCC): ICD-10-CM

## 2019-11-13 DIAGNOSIS — N18.9 CHRONIC KIDNEY DISEASE, UNSPECIFIED CKD STAGE: ICD-10-CM

## 2019-11-13 DIAGNOSIS — Z13.29 SCREENING FOR THYROID DISORDER: ICD-10-CM

## 2019-11-13 DIAGNOSIS — Z23 ENCOUNTER FOR IMMUNIZATION: ICD-10-CM

## 2019-11-13 DIAGNOSIS — I10 ESSENTIAL HYPERTENSION: ICD-10-CM

## 2019-11-13 DIAGNOSIS — E55.9 VITAMIN D DEFICIENCY: ICD-10-CM

## 2019-11-13 DIAGNOSIS — Z76.89 ENCOUNTER TO ESTABLISH CARE: Primary | ICD-10-CM

## 2019-11-13 DIAGNOSIS — D64.9 ANEMIA, UNSPECIFIED TYPE: ICD-10-CM

## 2019-11-13 DIAGNOSIS — F10.11 HISTORY OF ETOH ABUSE: ICD-10-CM

## 2019-11-13 DIAGNOSIS — M05.79 RHEUMATOID ARTHRITIS INVOLVING MULTIPLE SITES WITH POSITIVE RHEUMATOID FACTOR (HCC): ICD-10-CM

## 2019-11-13 RX ORDER — FUROSEMIDE 20 MG/1
20 TABLET ORAL
COMMUNITY
Start: 2014-10-27 | End: 2019-11-16 | Stop reason: ALTCHOICE

## 2019-11-16 RX ORDER — BISOPROLOL FUMARATE 5 MG/1
5 TABLET ORAL DAILY
Qty: 30 TAB | Refills: 2 | Status: SHIPPED | OUTPATIENT
Start: 2019-11-16

## 2019-12-18 ENCOUNTER — OFFICE VISIT (OUTPATIENT)
Dept: FAMILY MEDICINE CLINIC | Age: 84
End: 2019-12-18

## 2019-12-18 ENCOUNTER — HOME HEALTH ADMISSION (OUTPATIENT)
Dept: HOME HEALTH SERVICES | Facility: HOME HEALTH | Age: 84
End: 2019-12-18
Payer: MEDICARE

## 2019-12-18 VITALS
DIASTOLIC BLOOD PRESSURE: 84 MMHG | HEART RATE: 77 BPM | TEMPERATURE: 97.2 F | OXYGEN SATURATION: 100 % | SYSTOLIC BLOOD PRESSURE: 192 MMHG | RESPIRATION RATE: 18 BRPM

## 2019-12-18 DIAGNOSIS — L98.499 ULCER OF FINGER, UNSPECIFIED ULCER STAGE (HCC): Primary | ICD-10-CM

## 2019-12-18 DIAGNOSIS — R60.9 EDEMA, UNSPECIFIED TYPE: ICD-10-CM

## 2019-12-18 DIAGNOSIS — M25.9 REDNESS OF JOINT: ICD-10-CM

## 2019-12-18 DIAGNOSIS — R60.0 BILATERAL EDEMA OF LOWER EXTREMITY: ICD-10-CM

## 2019-12-18 DIAGNOSIS — I50.32 CHRONIC DIASTOLIC CONGESTIVE HEART FAILURE (HCC): ICD-10-CM

## 2019-12-18 DIAGNOSIS — N18.9 CHRONIC KIDNEY DISEASE, UNSPECIFIED CKD STAGE: ICD-10-CM

## 2019-12-18 DIAGNOSIS — R41.3 MEMORY CHANGES: ICD-10-CM

## 2019-12-18 NOTE — PROGRESS NOTES
Follow Up Visit Note    Chief Complaint   Patient presents with    Hypertension    Peripheral Edema     hands, legs, feet    Wound Dehiscence     left first finger wound has foul odor. HPI:  Jacob Han is a 80 y.o.  female  has a past medical history of Hypertension and Rheumatoid arthritis ([facility] [address] is here for the above complaint(s). Patient here today for follow-up. She has not been seen in the office since her establish care visit 1 month ago. Patient is accompanied by her son and daughter in law today. Patient has not had labs done as advised at last visit. Right thumb edema  Patient has right thumb redness, warmth and edema. Unclear how long this has looked like this. Patient states a \"few days. \" She does endorse pain to her right thumb. Patient does also endorse a history of gout. She denies any trauma to the thumb. Left finger ulceration  Patient presenting with left finger ulceration covered by a bandaid today. She state that her left finger started draining \"a few days ago. \" She denies any pain, warmth, fevers/chills,     LE edema  Bilateral lower extremity edema, ongoing. Patient denies any chest pain, shortness of breath, cough, abdominal pain, n/v/d. Hypertension  Patient currently taking   Key CAD CHF Meds             bisoprolol (ZEBETA) 5 mg tablet (Taking) Take 1 Tab by mouth daily. amLODIPine (NORVASC) 10 mg tablet (Taking) TAKE 1 TABLET DAILY      . BP today is   BP Readings from Last 1 Encounters:   12/18/19 192/84     Unclear if patient has been taking medications as prescribed. Patient lives alone and clearly is unable to open medication bottles 2 to contractures of hands/fingers from RA. Patient does not follow low salt diet. . Patient denies chest pain, shortness of breath, palpitations, lower extremity edema, dizziness/lightheadedness or syncopal episodes.      Review of Systems   Constitutional: Negative for chills, diaphoresis, fever, malaise/fatigue and weight loss. Eyes: Negative for blurred vision, double vision and pain. Respiratory: Negative for cough, sputum production, shortness of breath and wheezing. Cardiovascular: Negative for chest pain, palpitations, orthopnea, claudication and leg swelling. Gastrointestinal: Negative for abdominal pain, constipation, diarrhea, nausea and vomiting. Genitourinary: Negative for dysuria, frequency and urgency. Musculoskeletal: Positive for joint pain (RA, left thumb pain). Negative for back pain, falls, myalgias and neck pain. Skin: Positive for rash. Neurological: Negative for dizziness, tingling and headaches. Current Outpatient Medications   Medication Sig    bisoprolol (ZEBETA) 5 mg tablet Take 1 Tab by mouth daily.  amLODIPine (NORVASC) 10 mg tablet TAKE 1 TABLET DAILY    ascorbic acid, vitamin C, (VITAMIN C) 250 mg tablet Take 1 Tab by mouth two (2) times a day.  THIAMINE HCL PO Take  by mouth.  FOLIC ACID PO Take  by mouth.  ketoconazole (NIZORAL) 2 % topical cream Apply  to affected area daily. No current facility-administered medications for this visit. Health Maintenance   Topic Date Due    DTaP/Tdap/Td series (1 - Tdap) 06/28/1945    Shingrix Vaccine Age 50> (1 of 2) 06/28/1984    GLAUCOMA SCREENING Q2Y  06/28/1999    Bone Densitometry (Dexa) Screening  06/28/1999    MEDICARE YEARLY EXAM  05/31/2019    Influenza Age 9 to Adult  Completed    Pneumococcal 65+ years  Completed     Immunization History   Administered Date(s) Administered    Influenza Vaccine 10/25/2014    Influenza Vaccine (Tri) Adjuvanted 11/13/2019    Pneumococcal Polysaccharide (PPSV-23) 10/25/2012    Tetanus Toxoid 02/20/2013       Allergies and Medications: Reviewed and updated in EMR. Past Medical History:   Diagnosis Date    Hypertension     Rheumatoid arthritis ([facility] [address]        Surgical History: Reviewed and updated in EMR as appropriate.   Social History: Reviewed and updated in EMR as appropriate. Family History: Reviewed and updated in EMR as appropriate. OBJECTIVE:   Visit Vitals  /84   Pulse 77   Temp 97.2 °F (36.2 °C) (Oral)   Resp 18   SpO2 100%        Physical Exam  Vitals signs reviewed. Constitutional:       General: She is not in acute distress. Appearance: She is not diaphoretic. Neck:      Musculoskeletal: Normal range of motion and neck supple. Thyroid: No thyromegaly. Cardiovascular:      Rate and Rhythm: Normal rate and regular rhythm. Heart sounds: Normal heart sounds. No murmur. No friction rub. No gallop. Pulmonary:      Effort: Pulmonary effort is normal. No respiratory distress. Breath sounds: Normal breath sounds. No wheezing or rales. Chest:      Chest wall: No tenderness. Musculoskeletal:      Right hand: She exhibits decreased range of motion, tenderness, bony tenderness and deformity. She exhibits normal capillary refill. Left hand: She exhibits decreased range of motion, tenderness, bony tenderness, deformity and swelling. Decreased strength noted. 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 Skin:     General: Skin is warm and dry. Neurological:      Mental Status: She is alert and oriented to person, place, and time.    Psychiatric:         Mood and Affect: Mood and affect normal.         Cognition and Memory: Memory normal.         Judgment: Judgment normal.         LABS/RADIOLOGICAL TESTS:  Lab Results   Component Value Date/Time    WBC 9.3 06/27/2018 03:57 PM    HGB 10.4 (L) 06/27/2018 03:57 PM    HCT 31.9 (L) 06/27/2018 03:57 PM    PLATELET 754 62/37/8502 03:57 PM     Lab Results   Component Value Date/Time    Sodium 142 10/16/2018 02:29 PM    Potassium 4.3 10/16/2018 02:29 PM    Chloride 106 10/16/2018 02:29 PM    CO2 28 10/16/2018 02:29 PM Glucose 94 10/16/2018 02:29 PM    BUN 29 (H) 10/16/2018 02:29 PM    Creatinine 1.65 (H) 10/16/2018 02:29 PM     Lab Results   Component Value Date/Time    Cholesterol, total 171 11/16/2017 04:44 AM    HDL Cholesterol 69 11/16/2017 04:44 AM    LDL, calculated 80 11/16/2017 04:44 AM    Triglyceride 108 11/16/2017 04:44 AM     No results found for: GPT  Lab Results   Component Value Date/Time    Hemoglobin A1c 5.0 09/02/2017 01:02 AM         All lab results and radiological studies were reviewed and discussed with the patient. ASSESSMENT/PLAN:    Diagnoses and all orders for this visit:    1. Ulcer of finger, unspecified ulcer stage (HCC)  -     XR 2ND FINGER LT MIN 2 V; Future    2. Redness of joint  -     XR THUMB RT MIN 2 V; Future  -     URIC ACID; Future    3. Edema, unspecified type  -     XR THUMB RT MIN 2 V; Future  -     URIC ACID; Future    4. Bilateral edema of lower extremity  Gave patient and family member lab slip to go immediately to have labs done from last visit. Patient and family members verbalized understanding. Will call with lab results. -     URINALYSIS W/ RFLX MICROSCOPIC; Future    5. Chronic kidney disease, unspecified CKD stage  -     URINALYSIS W/ RFLX MICROSCOPIC; Future        ICD-10-CM ICD-9-CM    1. Ulcer of finger, unspecified ulcer stage (HCC) L98.499 707.8 XR 2ND FINGER LT MIN 2 V   2. Redness of joint M25.9 719.90 XR THUMB RT MIN 2 V      URIC ACID   3. Edema, unspecified type R60.9 782.3 XR THUMB RT MIN 2 V      URIC ACID   4. Bilateral edema of lower extremity R60.0 782.3 URINALYSIS W/ RFLX MICROSCOPIC   5. Chronic kidney disease, unspecified CKD stage N18.9 585.9 URINALYSIS W/ RFLX MICROSCOPIC       Patient verbalized understanding and agreement with the plan. Patient was given an after-visit summary. Follow-up in 1 week or sooner if worsening symptoms.     More than 50% of this 30 min visit was spent counseling the patient face to face about etiology and treatment of health conditions outlined in assessment and plan        Kerrie Baez PA-C  [facility]  [facility], [address] [place] [address], [address] [place] [address] [phone]  [place] [phone]

## 2019-12-18 NOTE — PROGRESS NOTES
Elaine Holland presents today for   Chief Complaint   Patient presents with    Hypertension    Peripheral Edema     hands, legs, feet       Is someone accompanying this pt? Son and daughter in law  Learning Assessment:  Learning Assessment 6/27/2018   PRIMARY LEARNER Patient   HIGHEST LEVEL OF EDUCATION - PRIMARY LEARNER  DID NOT GRADUATE HIGH SCHOOL   PRIMARY LANGUAGE ENGLISH   LEARNER PREFERENCE PRIMARY READING   ANSWERED BY David Downs   RELATIONSHIP SELF       Abuse Screening:  Abuse Screening Questionnaire 11/13/2019   Do you ever feel afraid of your partner? N   Are you in a relationship with someone who physically or mentally threatens you? N   Is it safe for you to go home? Y       Fall Risk  Fall Risk Assessment, last 12 mths 11/13/2019   Able to walk? Yes   Fall in past 12 months? No       Health Maintenance reviewed and discussed and ordered per Provider. Health Maintenance Due   Topic Date Due    DTaP/Tdap/Td series (1 - Tdap) 06/28/1945    Shingrix Vaccine Age 50> (1 of 2) 06/28/1984    GLAUCOMA SCREENING Q2Y  06/28/1999    Bone Densitometry (Dexa) Screening  06/28/1999    MEDICARE YEARLY EXAM  05/31/2019   . Coordination of Care:  1. Have you been to the ER, urgent care clinic since your last visit? Hospitalized since your last visit? No    2. Have you seen or consulted any other health care providers outside of the [address] [place] [address] since your last visit? Include any pap smears or colon screening.  no      Last  Checked na  Last UDS Checked na  Last Pain contract signed: na

## 2019-12-19 ENCOUNTER — HOME CARE VISIT (OUTPATIENT)
Dept: HOME HEALTH SERVICES | Facility: HOME HEALTH | Age: 84
End: 2019-12-19

## 2019-12-19 ENCOUNTER — TELEPHONE (OUTPATIENT)
Dept: FAMILY MEDICINE CLINIC | Age: 84
End: 2019-12-19

## 2019-12-19 ENCOUNTER — HOME CARE VISIT (OUTPATIENT)
Dept: SCHEDULING | Facility: HOME HEALTH | Age: 84
End: 2019-12-19

## 2019-12-19 VITALS
DIASTOLIC BLOOD PRESSURE: 98 MMHG | TEMPERATURE: 100.4 F | RESPIRATION RATE: 18 BRPM | OXYGEN SATURATION: 93 % | HEART RATE: 75 BPM | SYSTOLIC BLOOD PRESSURE: 168 MMHG

## 2019-12-19 NOTE — TELEPHONE ENCOUNTER
\"Liz Haro\" of [facility] called and asked to speak to provider's nurse, as she was not available I told her I would send a message to call back.

## 2019-12-20 ENCOUNTER — HOME CARE VISIT (OUTPATIENT)
Dept: SCHEDULING | Facility: HOME HEALTH | Age: 84
End: 2019-12-20

## 2019-12-20 PROBLEM — M79.89 SWELLING OF RIGHT HAND: Status: ACTIVE | Noted: 2019-12-20

## 2019-12-20 PROBLEM — N17.9 AKI (ACUTE KIDNEY INJURY) (HCC): Status: ACTIVE | Noted: 2019-12-20

## 2019-12-20 LAB
25(OH)D3+25(OH)D2 SERPL-MCNC: 17.7 NG/ML (ref 30–100)
ALBUMIN SERPL-MCNC: 3.5 G/DL (ref 3.5–4.7)
ALBUMIN/GLOB SERPL: 1 {RATIO} (ref 1.2–2.2)
ALP SERPL-CCNC: 73 IU/L (ref 39–117)
ALT SERPL-CCNC: 4 IU/L (ref 0–32)
AST SERPL-CCNC: 15 IU/L (ref 0–40)
BASOPHILS # BLD AUTO: 0 X10E3/UL (ref 0–0.2)
BASOPHILS NFR BLD AUTO: 0 %
BILIRUB SERPL-MCNC: 0.4 MG/DL (ref 0–1.2)
BUN SERPL-MCNC: 23 MG/DL (ref 8–27)
BUN/CREAT SERPL: 14 (ref 12–28)
CALCIUM SERPL-MCNC: 9 MG/DL (ref 8.7–10.3)
CHLORIDE SERPL-SCNC: 103 MMOL/L (ref 96–106)
CO2 SERPL-SCNC: 21 MMOL/L (ref 20–29)
CREAT SERPL-MCNC: 1.62 MG/DL (ref 0.57–1)
CREAT UR-MCNC: NORMAL MG/DL
EOSINOPHIL # BLD AUTO: 0 X10E3/UL (ref 0–0.4)
EOSINOPHIL NFR BLD AUTO: 0 %
ERYTHROCYTE [DISTWIDTH] IN BLOOD BY AUTOMATED COUNT: 14.5 % (ref 12.3–15.4)
FERRITIN SERPL-MCNC: 311 NG/ML (ref 15–150)
FOLATE SERPL-MCNC: 5.7 NG/ML
GLOBULIN SER CALC-MCNC: 3.5 G/DL (ref 1.5–4.5)
GLUCOSE SERPL-MCNC: 98 MG/DL (ref 65–99)
GLUCOSE UR QL: NORMAL
HCT VFR BLD AUTO: 26.6 % (ref 34–46.6)
HGB BLD-MCNC: 8.5 G/DL (ref 11.1–15.9)
IMM GRANULOCYTES # BLD AUTO: 0 X10E3/UL (ref 0–0.1)
IMM GRANULOCYTES NFR BLD AUTO: 0 %
IRON SATN MFR SERPL: 8 % (ref 15–55)
IRON SERPL-MCNC: 13 UG/DL (ref 27–139)
KETONES UR QL STRIP: NORMAL
LYMPHOCYTES # BLD AUTO: 0.8 X10E3/UL (ref 0.7–3.1)
LYMPHOCYTES NFR BLD AUTO: 9 %
MCH RBC QN AUTO: 29.5 PG (ref 26.6–33)
MCHC RBC AUTO-ENTMCNC: 32 G/DL (ref 31.5–35.7)
MCV RBC AUTO: 92 FL (ref 79–97)
MICROALBUMIN UR-MCNC: NORMAL
MONOCYTES # BLD AUTO: 0.6 X10E3/UL (ref 0.1–0.9)
MONOCYTES NFR BLD AUTO: 7 %
NEUTROPHILS # BLD AUTO: 7.4 X10E3/UL (ref 1.4–7)
NEUTROPHILS NFR BLD AUTO: 84 %
NT-PROBNP SERPL-MCNC: 1392 PG/ML (ref 0–738)
PH UR STRIP: NORMAL [PH]
PLATELET # BLD AUTO: 238 X10E3/UL (ref 150–450)
POTASSIUM SERPL-SCNC: 4.5 MMOL/L (ref 3.5–5.2)
PROT SERPL-MCNC: 7 G/DL (ref 6–8.5)
PROT UR QL STRIP: NORMAL
RBC # BLD AUTO: 2.88 X10E6/UL (ref 3.77–5.28)
SODIUM SERPL-SCNC: 139 MMOL/L (ref 134–144)
SP GR UR: NORMAL
TIBC SERPL-MCNC: 163 UG/DL (ref 250–450)
TSH SERPL DL<=0.005 MIU/L-ACNC: 1.94 UIU/ML (ref 0.45–4.5)
UIBC SERPL-MCNC: 150 UG/DL (ref 118–369)
URATE SERPL-MCNC: 9.5 MG/DL (ref 2.5–7.1)
VIT B12 SERPL-MCNC: 897 PG/ML (ref 232–1245)
WBC # BLD AUTO: 9 X10E3/UL (ref 3.4–10.8)

## 2019-12-20 PROCEDURE — A6234 HYDROCOLLD DRG <=16 W/O BDR: HCPCS

## 2019-12-20 PROCEDURE — A5120 SKIN BARRIER, WIPE OR SWAB: HCPCS

## 2019-12-20 PROCEDURE — A6260 WOUND CLEANSER ANY TYPE/SIZE: HCPCS

## 2019-12-20 PROCEDURE — A4927 NON-STERILE GLOVES: HCPCS

## 2019-12-20 NOTE — TELEPHONE ENCOUNTER
Called patient's son to let him know that I am recommending that patient go to the ED. Her labs indicate that she has acute on chronic congestive heart failure. I also called [facility] ED to let them know the patient is coming for evaluation. Kerrie Baez PA-C  [facility]  [address]  [address]

## 2019-12-20 NOTE — TELEPHONE ENCOUNTER
Returned the call to Carlisle with home health. States that she went to patient's home for a visit yesterday and patient had elevated BP of 160/100. Patient had not taken her BP med so Carlisle administered patient the [facility]. Carlisle states that patient did not have Bisoprolol in the house to take. Carlisle states that wound nurse is applying dressing to left first finger. Carlisle states that in a couple weeks, patient's son and daughter in law will be returning to [place], where they live, and that patient will be left living alone. Carlisle requested an order for Speech Therapy and Social Work. 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 Informed Carlisle that provider would be agreeable to speech and social work. States she will include those in patient's plan of care. Carlisle was also concerned that patient might have had a fever but was unable to fully assess because patient had a space heater blowing directly on her. Routed to provider for review. Will contact [facility] to obtain any available lab results.

## 2019-12-21 ENCOUNTER — HOME CARE VISIT (OUTPATIENT)
Dept: HOME HEALTH SERVICES | Facility: HOME HEALTH | Age: 84
End: 2019-12-21

## 2019-12-21 PROBLEM — L03.90 CELLULITIS: Status: ACTIVE | Noted: 2019-12-21

## 2019-12-27 PROCEDURE — A4216 STERILE WATER/SALINE, 10 ML: HCPCS

## 2019-12-27 PROCEDURE — A6212 FOAM DRG <=16 SQ IN W/BORDER: HCPCS

## 2019-12-27 PROCEDURE — A6445 CONFORM BAND S W <3"/YD: HCPCS

## 2020-03-13 NOTE — PROGRESS NOTES
Hematology/Oncology Consultation Note      Date: 3/16/2020    Name: Walker Baca  : 1934        Gia Baez PA-C         Subjective:     Chief complaint: Anemia    History of Present Illness:   Ms. Ji Baker is a most pleasant 80y.o. year old female who was seen for consultation of anemia. The patient was accompanied by her daughter during this visit. The patient has a past medical history significant of Iron deficiency anemia (), Congestive heart failure, Chronic kidney disease, HTN, lower extremity edema. She had been on Iron pills in the past but then stopped a it made her constipated. Her colonoscopy was done many years ago. Labs review indicated Chronic anemia since at least 2014. Her MCV was elevated at initial then dropped to normal range since 2017. No leukopenia or thrombocytopenia. CBC 2020: Hb/Hct 8.5/27.9, WBC 8.2, PLT 289K. The patient otherwise has no other complaints. Denied fever, chills, night sweat, unintentional weight loss, chronic fatigue, skin lumps or bumps, acute bleeding or bruising issues. No acute bleeding, blood in stool, dark stool, melena, hematochezia, hemoptysis, dark urine, or easily bruising. Denied headache, acute vision change, dizziness, chest pain, worsen shortness of breath, palpitation, productive cough, nausea, vomiting, abdominal pain, altered bowel habits, dysuria, new bone pain or back pain, focal numbness or weakness. She is staying with her daughter at home. Family History: denied family history of blood diseases or cancers. Oncologic History:   No history exists.        Past Medical History, Family History, and Social History:    Past Medical History:   Diagnosis Date    [facility]     Hypertension     Rheumatoid arthritis ([facility] [address] [facility]      Past Surgical History:   Procedure Laterality Date    HX GI      COLECTOMY    HX GI      COLONOSCOPY    HX HYSTERECTOMY      HYSTERECTOMY     Social History Socioeconomic History    Marital status:      Spouse name: Not on file    Number of children: Not on file    Years of education: Not on file    Highest education level: Not on file   Occupational History    Not on file   Social Needs    Financial resource strain: Not on file    Food insecurity     Worry: Not on file     Inability: Not on file    Transportation needs     Medical: Not on file     Non-medical: Not on file   Tobacco Use    Smoking status: Never Smoker    Smokeless tobacco: Never Used   Substance and Sexual Activity    Alcohol use: No     Comment: not since in rehab    Drug use: No    Sexual activity: Not Currently   Lifestyle    Physical activity     Days per week: Not on file     Minutes per session: Not on file    Stress: Not on file   Relationships    Social connections     Talks on phone: Not on file     Gets together: Not on file     Attends Bahai service: Not on file     Active member of club or organization: Not on file     Attends meetings of clubs or organizations: Not on file     Relationship status: Not on file    Intimate partner violence     Fear of current or ex partner: Not on file     Emotionally abused: Not on file     Physically abused: Not on file     Forced sexual activity: Not on file   Other Topics Concern    Not on file   Social History Narrative    SON [facility]ord[facility] Booker [facility] [address], [place]     Family History   Problem Relation Age of Onset    Diabetes Mother     No Known Problems Father      Current Outpatient Medications   Medication Sig Dispense Refill    acetaminophen (TYLENOL) 500 mg tablet Take 1 Tab by mouth every six (6) hours as needed for Pain. 60 Tab 0    allopurinol (ZYLOPRIM) 100 mg tablet Take 1 Tab by mouth daily. 30 Tab 0    clindamycin (CLEOCIN) 300 mg capsule Take 1 Cap by mouth three (3) times daily.  9 Cap 0    mirtazapine (REMERON) 7.5 mg tablet Take 7.5 mg by mouth nightly.  hydrALAZINE (APRESOLINE) 25 mg tablet Take 25 mg by mouth three (3) times daily.  bisoprolol (ZEBETA) 5 mg tablet Take 1 Tab by mouth daily. 30 Tab 2    THIAMINE HCL PO Take 1 Tab by mouth daily.  FOLIC ACID PO Take 1 Tab by mouth daily.  ketoconazole (NIZORAL) 2 % topical cream Apply  to affected area daily. 15 g 0    amLODIPine (NORVASC) 10 mg tablet TAKE 1 TABLET DAILY 90 Tab 1    ascorbic acid, vitamin C, (VITAMIN C) 250 mg tablet Take 1 Tab by mouth two (2) times a day. 60 Tab 3       Review of Systems   Constitutional: Negative for chills, diaphoresis, fever, malaise/fatigue and weight loss. Respiratory: Negative for cough, hemoptysis, shortness of breath and wheezing. Cardiovascular: Negative for chest pain, palpitations and leg swelling. Gastrointestinal: Negative for abdominal pain, diarrhea, heartburn, nausea and vomiting. Genitourinary: Negative for dysuria, frequency, hematuria and urgency. Musculoskeletal: Negative for joint pain and myalgias. Skin: Negative for itching and rash. Neurological: Negative for dizziness, seizures, weakness and headaches. Psychiatric/Behavioral: Negative for depression. The patient does not have insomnia. Objective:     Visit Vitals  /70   Pulse 61   Temp 97.1 °F (36.2 °C)   Ht 5' 2\" (1.575 m)   Wt 54.9 kg (121 lb)   SpO2 93%   BMI 22.13 kg/m²         Physical Exam  Constitutional:       Appearance: Normal appearance. Comments: On wheelchair   HENT:      Head: Normocephalic and atraumatic. Eyes:      Pupils: Pupils are equal, round, and reactive to light. Neck:      Musculoskeletal: Neck supple. Cardiovascular:      Rate and Rhythm: Normal rate and regular rhythm. Heart sounds: Normal heart sounds. Pulmonary:      Effort: Pulmonary effort is normal.      Breath sounds: Normal breath sounds. Abdominal:      General: Bowel sounds are normal.      Palpations: Abdomen is soft. Tenderness: There is no abdominal tenderness. There is no guarding. Musculoskeletal: Normal range of motion. Left lower leg: No edema. Skin:     General: Skin is warm. Neurological:      General: No focal deficit present. Mental Status: She is alert and oriented to person, place, and time. Mental status is at baseline. Diagnostics:      No results found for this or any previous visit (from the past 96 hour(s)). Imaging:  No results found for this or any previous visit. Results for orders placed during the hospital encounter of 12/20/19   XR HAND RT AP/LAT    Narrative INDICATION: hand swelling, pain. Right hand swelling and pain  COMPARISON:none  TECHNIQUE: AP and lateral views of the right hand, 4 images      Impression IMPRESSION:  Suboptimal patient positioning. Generalized osteopenia. Ulnar subluxations at  the second through fifth MCP joints. No definite acute fracture. Advanced  arthritic changes seen throughout the hand and wrist. There is strong clinical  suspicion for occult fracture or infectious process, consider evaluation with  MRI. Results for orders placed during the hospital encounter of 09/30/17   CT HEAD WO CONT    Narrative Noncontrast head CT. History: Stroke. Left-sided weakness. TECHNIQUE: 4 mm axial images without contrast. Coronal and sagittal  reconstructions. Findings: Cortical atrophy with ischemic white matter change. Previous right  caudate nucleus infarct. No acute infarcts. No intracranial hemorrhage. Bony  calvarium intact. Ethmoid sinus disease. Impression IMPRESSION: No acute intracranial abnormalities. Assessment and Plan:                                        -- The patient has a past medical history significant of Iron deficiency anemia (2014), Congestive heart failure, Chronic kidney disease, HTN, lower extremity edema.      # Normocytic Anemia  # History of Iron deficiency anemia, chronic kidney disease  -- Labs review indicated Chronic anemia since at least 8/23/2014. Her MCV was elevated at initial then dropped to normal range since 11/14/2017. No leukopenia or thrombocytopenia. --  She had been on Iron pills in the past but then stopped as it made her constipated. Her colonoscopy was done many years ago. -- Today I have reviewed with the patient and family about her lab findings. CBC 2/5/2020: Hb/Hct 8.5/27.9, WBC 8.2, PLT 289K. TFTs 12/21/2019 WNL. B12 897, folate 5.7. The differential diagnosis of normocytic anemia includes a variety of diverse diagnoses. It may be seen with early iron deficiency, anemia of chronic disease, combined deficiency state (iron deficiency combined with either B12 or folate deficiency), acute blood loss, non-spherocytic hemolytic anemia, myelodysplastic syndrome, renal failure, liver failure, or hypothyroidism. The patients history and physical examination allow some of these diagnoses to be excluded. -- Laboratories that should be checked today include reticulocyte count, Iron study with ferritin, ESR, CRP, EPO, LDH, and haptoglobin. About 70% of early cases of plasma cell disorders have a normochromic normocytic anemia, so I will also check a SPEP and SFLC. -- I will see the patient back in clinic in about 2-3 weeks for lab reports. Always sooner if required.     Orders Placed This Encounter    CBC WITH AUTOMATED DIFF     Standing Status:   Future     Standing Expiration Date:   3/17/2021    IRON PROFILE     Standing Status:   Future     Standing Expiration Date:   3/17/2021    FERRITIN     Standing Status:   Future     Standing Expiration Date:   3/16/2021    RETICULOCYTE COUNT     Standing Status:   Future     Standing Expiration Date:   3/16/2021    ERYTHROPOIETIN     Standing Status:   Future     Standing Expiration Date:   3/16/2021    SED RATE (ESR)     Standing Status:   Future     Standing Expiration Date:   3/16/2021    LD     Standing Status:   Future Standing Expiration Date:   3/16/2021    HAPTOGLOBIN     Standing Status:   Future     Standing Expiration Date:   3/16/2021    C REACTIVE PROTEIN, QT     Standing Status:   Future     Standing Expiration Date:   3/16/2021    GAMMOPATHY EVAL, SPEP/BETHANY, IG QT/FLC     Standing Status:   Future     Standing Expiration Date:   3/16/2021         Ms. Bandar iSngh has a reminder for a \"due or due soon\" health maintenance. I have asked that she contact her primary care provider for follow-up on this health maintenance. All of patient's questions answered to their apparent satisfaction. They verbally show understanding and agreement with aforementioned plan. I would like to thank Bryan Hernandez PA-C  for allowing me to participate in the care of this very pleasant patient. If I can be of further assistance please do not hesitate to call. Ines Land MD  3/16/2020          About 43 minutes were spent for this encounter with more than 50% of the time spent in face-to-face counseling, discussing on diagnosis and management plan going forward, and co-ordination of care. Parts of this document has been produced using Dragon dictation system. Unrecognized errors in transcription may be present. Please do not hesitate to reach out for any questions or clarifications.       CC: Larry Baez PA-C

## 2020-03-16 ENCOUNTER — OFFICE VISIT (OUTPATIENT)
Dept: ONCOLOGY | Age: 85
End: 2020-03-16

## 2020-03-16 VITALS
TEMPERATURE: 97.1 F | HEIGHT: 62 IN | OXYGEN SATURATION: 93 % | WEIGHT: 121 LBS | DIASTOLIC BLOOD PRESSURE: 70 MMHG | HEART RATE: 61 BPM | SYSTOLIC BLOOD PRESSURE: 156 MMHG | BODY MASS INDEX: 22.26 KG/M2

## 2020-03-16 DIAGNOSIS — D64.9 NORMOCYTIC ANEMIA: Primary | ICD-10-CM

## 2020-03-16 DIAGNOSIS — Z86.2 HISTORY OF IRON DEFICIENCY ANEMIA: ICD-10-CM

## 2020-03-16 NOTE — PATIENT INSTRUCTIONS
Anemia: Care Instructions  Your Care Instructions    Anemia is a low level of red blood cells, which carry oxygen throughout your body. Many things can cause anemia. Lack of iron is one of the most common causes. Your body needs iron to make hemoglobin, a substance in red blood cells that carries oxygen from the lungs to your body's cells. Without enough iron, the body produces fewer and smaller red blood cells. As a result, your body's cells do not get enough oxygen, and you feel tired and weak. And you may have trouble concentrating. Bleeding is the most common cause of a lack of iron. You may have heavy menstrual bleeding or bleeding caused by conditions such as ulcers, hemorrhoids, or cancer. Regular use of aspirin or other anti-inflammatory medicines (such as ibuprofen) also can cause bleeding in some people. A lack of iron in your diet also can cause anemia, especially at times when the body needs more iron, such as during pregnancy, infancy, and the teen years. Your doctor may have prescribed iron pills. It may take several months of treatment for your iron levels to return to normal. Your doctor also may suggest that you eat foods that are rich in iron, such as meat and beans. There are many other causes of anemia. It is not always due to a lack of iron. Finding the specific cause of your anemia will help your doctor find the right treatment for you. Follow-up care is a key part of your treatment and safety. Be sure to make and go to all appointments, and call your doctor if you are having problems. It's also a good idea to know your test results and keep a list of the medicines you take. How can you care for yourself at home? · Take your medicines exactly as prescribed. Call your doctor if you think you are having a problem with your medicine. · If your doctor recommends iron pills, take them as directed:  ? Try to take the pills on an empty stomach about 1 hour before or 2 hours after meals. But you may need to take iron with food to avoid an upset stomach. ? Do not take antacids or drink milk or caffeine drinks (such as coffee, tea, or cola) at the same time or within 2 hours of the time that you take your iron. They can make it hard for your body to absorb the iron. ? Vitamin C (from food or supplements) helps your body absorb iron. Try taking iron pills with a glass of orange juice or some other food that is high in vitamin C, such as citrus fruits. ? Iron pills may cause stomach problems, such as heartburn, nausea, diarrhea, constipation, and cramps. Be sure to drink plenty of fluids, and include fruits, vegetables, and fiber in your diet each day. Iron pills often make your bowel movements dark or green. ? If you forget to take an iron pill, do not take a double dose of iron the next time you take a pill. ? Keep iron pills out of the reach of small children. An overdose of iron can be very dangerous. · Follow your doctor's advice about eating iron-rich foods. These include red meat, shellfish, poultry, eggs, beans, raisins, whole-grain bread, and leafy green vegetables. · Steam vegetables to help them keep their iron content. When should you call for help? Call 911 anytime you think you may need emergency care. For example, call if:    · You have symptoms of a heart attack. These may include:  ? Chest pain or pressure, or a strange feeling in the chest.  ? Sweating. ? Shortness of breath. ? Nausea or vomiting. ? Pain, pressure, or a strange feeling in the back, neck, jaw, or upper belly or in one or both shoulders or arms. ? Lightheadedness or sudden weakness. ? A fast or irregular heartbeat. After you call  911, the  may tell you to chew 1 adult-strength or 2 to 4 low-dose aspirin. Wait for an ambulance.  Do not try to drive yourself.     · You passed out (lost consciousness).    Call your doctor now or seek immediate medical care if:    · You have new or increased shortness of breath.     · You are dizzy or lightheaded, or you feel like you may faint.     · Your fatigue and weakness continue or get worse.     · You have any abnormal bleeding, such as:  ? Nosebleeds. ? Vaginal bleeding that is different (heavier, more frequent, at a different time of the month) than what you are used to.  ? Bloody or black stools, or rectal bleeding. ? Bloody or pink urine.    Watch closely for changes in your health, and be sure to contact your doctor if:    · You do not get better as expected. Where can you learn more? Go to http://pino-naomi.info/  Enter R301 in the search box to learn more about \"Anemia: Care Instructions. \"  Current as of: November 7, 2019Content Version: 12.4  © 1054-1201 [employer]. Care instructions adapted under license by [employer] (which disclaims liability or warranty for this information). If you have questions about a medical condition or this instruction, always ask your healthcare professional. [address] any warranty or liability for your use of this information.

## 2020-03-16 NOTE — LETTER
3/16/20 Patient: Drake Mireles YOB: 1934 Date of Visit: 3/16/2020 Terrence De Paz PA-C 
[address] [facility] [address] [place] [address] VIA In Basket Dear Terrence De Paz PA-C, Thank you for referring Ms. Yenifer Holman to [place] [address] OFFICE for evaluation. My notes for this consultation are attached. If you have questions, please do not hesitate to call me. I look forward to following your patient along with you. Sincerely, Ines Land MD

## 2020-03-17 DIAGNOSIS — D50.8 OTHER IRON DEFICIENCY ANEMIA: Primary | ICD-10-CM

## 2020-03-17 RX ORDER — ONDANSETRON 2 MG/ML
8 INJECTION INTRAMUSCULAR; INTRAVENOUS AS NEEDED
Status: CANCELLED | OUTPATIENT
Start: 2020-04-08

## 2020-03-17 RX ORDER — DIPHENHYDRAMINE HYDROCHLORIDE 50 MG/ML
50 INJECTION, SOLUTION INTRAMUSCULAR; INTRAVENOUS AS NEEDED
Status: CANCELLED
Start: 2020-04-08

## 2020-03-17 RX ORDER — ACETAMINOPHEN 325 MG/1
650 TABLET ORAL AS NEEDED
Status: CANCELLED
Start: 2020-04-08

## 2020-03-17 RX ORDER — SODIUM CHLORIDE 9 MG/ML
10 INJECTION INTRAMUSCULAR; INTRAVENOUS; SUBCUTANEOUS AS NEEDED
Status: CANCELLED | OUTPATIENT
Start: 2020-04-08

## 2020-03-17 RX ORDER — HYDROCORTISONE SODIUM SUCCINATE 100 MG/2ML
100 INJECTION, POWDER, FOR SOLUTION INTRAMUSCULAR; INTRAVENOUS AS NEEDED
Status: CANCELLED | OUTPATIENT
Start: 2020-04-08

## 2020-03-17 RX ORDER — DIPHENHYDRAMINE HYDROCHLORIDE 50 MG/ML
25 INJECTION, SOLUTION INTRAMUSCULAR; INTRAVENOUS AS NEEDED
Status: CANCELLED
Start: 2020-04-08

## 2020-03-17 RX ORDER — EPINEPHRINE 1 MG/ML
0.3 INJECTION, SOLUTION, CONCENTRATE INTRAVENOUS AS NEEDED
Status: CANCELLED | OUTPATIENT
Start: 2020-04-08

## 2020-03-17 RX ORDER — ALBUTEROL SULFATE 0.83 MG/ML
2.5 SOLUTION RESPIRATORY (INHALATION) AS NEEDED
Status: CANCELLED
Start: 2020-04-08

## 2020-03-17 RX ORDER — SODIUM CHLORIDE 0.9 % (FLUSH) 0.9 %
10 SYRINGE (ML) INJECTION AS NEEDED
Status: CANCELLED
Start: 2020-04-08

## 2020-03-17 RX ORDER — SODIUM CHLORIDE 9 MG/ML
25 INJECTION, SOLUTION INTRAVENOUS CONTINUOUS
Status: CANCELLED | OUTPATIENT
Start: 2020-04-08

## 2020-03-17 RX ORDER — HEPARIN 100 UNIT/ML
300-500 SYRINGE INTRAVENOUS AS NEEDED
Status: CANCELLED
Start: 2020-04-08

## 2020-03-27 ENCOUNTER — TELEPHONE (OUTPATIENT)
Dept: ONCOLOGY | Age: 85
End: 2020-03-27

## 2020-03-27 NOTE — TELEPHONE ENCOUNTER
Spoke with pts sister rescheduled appt to 6 wks after last iron infusion & advised to have labs a week before

## 2020-04-08 ENCOUNTER — HOSPITAL ENCOUNTER (OUTPATIENT)
Dept: INFUSION THERAPY | Age: 85
Discharge: HOME | End: 2020-04-08
Payer: MEDICARE

## 2020-04-08 VITALS
RESPIRATION RATE: 16 BRPM | DIASTOLIC BLOOD PRESSURE: 80 MMHG | HEART RATE: 59 BPM | SYSTOLIC BLOOD PRESSURE: 171 MMHG | TEMPERATURE: 98.2 F

## 2020-04-08 DIAGNOSIS — D50.8 OTHER IRON DEFICIENCY ANEMIA: Primary | ICD-10-CM

## 2020-04-08 PROCEDURE — 96374 THER/PROPH/DIAG INJ IV PUSH: CPT

## 2020-04-08 PROCEDURE — 74011250636 HC RX REV CODE- 250/636: Performed by: INTERNAL MEDICINE

## 2020-04-08 PROCEDURE — 96365 THER/PROPH/DIAG IV INF INIT: CPT

## 2020-04-08 RX ORDER — SODIUM CHLORIDE 9 MG/ML
25 INJECTION, SOLUTION INTRAVENOUS CONTINUOUS
Status: DISCONTINUED | OUTPATIENT
Start: 2020-04-08 | End: 2020-04-09 | Stop reason: HOSPADM

## 2020-04-08 RX ORDER — ACETAMINOPHEN 325 MG/1
650 TABLET ORAL AS NEEDED
Status: CANCELLED
Start: 2020-04-15

## 2020-04-08 RX ORDER — HEPARIN 100 UNIT/ML
300-500 SYRINGE INTRAVENOUS AS NEEDED
Status: CANCELLED
Start: 2020-04-15

## 2020-04-08 RX ORDER — SODIUM CHLORIDE 9 MG/ML
10 INJECTION INTRAMUSCULAR; INTRAVENOUS; SUBCUTANEOUS AS NEEDED
Status: CANCELLED | OUTPATIENT
Start: 2020-04-15

## 2020-04-08 RX ORDER — ALBUTEROL SULFATE 0.83 MG/ML
2.5 SOLUTION RESPIRATORY (INHALATION) AS NEEDED
Status: CANCELLED
Start: 2020-04-15

## 2020-04-08 RX ORDER — ONDANSETRON 2 MG/ML
8 INJECTION INTRAMUSCULAR; INTRAVENOUS AS NEEDED
Status: CANCELLED | OUTPATIENT
Start: 2020-04-15

## 2020-04-08 RX ORDER — SODIUM CHLORIDE 0.9 % (FLUSH) 0.9 %
10-40 SYRINGE (ML) INJECTION AS NEEDED
Status: DISCONTINUED | OUTPATIENT
Start: 2020-04-08 | End: 2020-04-12 | Stop reason: HOSPADM

## 2020-04-08 RX ORDER — SODIUM CHLORIDE 0.9 % (FLUSH) 0.9 %
10 SYRINGE (ML) INJECTION AS NEEDED
Status: CANCELLED
Start: 2020-04-15

## 2020-04-08 RX ORDER — DIPHENHYDRAMINE HYDROCHLORIDE 50 MG/ML
25 INJECTION, SOLUTION INTRAMUSCULAR; INTRAVENOUS AS NEEDED
Status: CANCELLED
Start: 2020-04-15

## 2020-04-08 RX ORDER — EPINEPHRINE 1 MG/ML
0.3 INJECTION, SOLUTION, CONCENTRATE INTRAVENOUS AS NEEDED
Status: CANCELLED | OUTPATIENT
Start: 2020-04-15

## 2020-04-08 RX ORDER — DIPHENHYDRAMINE HYDROCHLORIDE 50 MG/ML
50 INJECTION, SOLUTION INTRAMUSCULAR; INTRAVENOUS AS NEEDED
Status: CANCELLED
Start: 2020-04-15

## 2020-04-08 RX ORDER — HYDROCORTISONE SODIUM SUCCINATE 100 MG/2ML
100 INJECTION, POWDER, FOR SOLUTION INTRAMUSCULAR; INTRAVENOUS AS NEEDED
Status: CANCELLED | OUTPATIENT
Start: 2020-04-15

## 2020-04-08 RX ORDER — SODIUM CHLORIDE 9 MG/ML
25 INJECTION, SOLUTION INTRAVENOUS CONTINUOUS
Status: CANCELLED | OUTPATIENT
Start: 2020-04-15

## 2020-04-08 RX ADMIN — Medication 10 ML: at 15:36

## 2020-04-08 RX ADMIN — Medication 10 ML: at 15:07

## 2020-04-08 RX ADMIN — FERRIC CARBOXYMALTOSE INJECTION 750 MG: 50 INJECTION, SOLUTION INTRAVENOUS at 15:26

## 2020-04-15 ENCOUNTER — HOSPITAL ENCOUNTER (OUTPATIENT)
Dept: INFUSION THERAPY | Age: 85
Discharge: HOME | End: 2020-04-15
Payer: MEDICARE

## 2020-04-15 VITALS
OXYGEN SATURATION: 100 % | RESPIRATION RATE: 16 BRPM | HEART RATE: 59 BPM | TEMPERATURE: 97.8 F | DIASTOLIC BLOOD PRESSURE: 78 MMHG | SYSTOLIC BLOOD PRESSURE: 205 MMHG

## 2020-04-15 DIAGNOSIS — D50.8 OTHER IRON DEFICIENCY ANEMIA: Primary | ICD-10-CM

## 2020-04-15 PROCEDURE — 96365 THER/PROPH/DIAG IV INF INIT: CPT

## 2020-04-15 PROCEDURE — 74011250636 HC RX REV CODE- 250/636: Performed by: INTERNAL MEDICINE

## 2020-04-15 RX ORDER — EPINEPHRINE 1 MG/ML
0.3 INJECTION, SOLUTION, CONCENTRATE INTRAVENOUS AS NEEDED
Status: CANCELLED | OUTPATIENT
Start: 2020-04-22

## 2020-04-15 RX ORDER — SODIUM CHLORIDE 0.9 % (FLUSH) 0.9 %
10 SYRINGE (ML) INJECTION AS NEEDED
Status: CANCELLED
Start: 2020-04-22

## 2020-04-15 RX ORDER — DIPHENHYDRAMINE HYDROCHLORIDE 50 MG/ML
25 INJECTION, SOLUTION INTRAMUSCULAR; INTRAVENOUS AS NEEDED
Status: CANCELLED
Start: 2020-04-22

## 2020-04-15 RX ORDER — HYDROCORTISONE SODIUM SUCCINATE 100 MG/2ML
100 INJECTION, POWDER, FOR SOLUTION INTRAMUSCULAR; INTRAVENOUS AS NEEDED
Status: CANCELLED | OUTPATIENT
Start: 2020-04-22

## 2020-04-15 RX ORDER — ONDANSETRON 2 MG/ML
8 INJECTION INTRAMUSCULAR; INTRAVENOUS AS NEEDED
Status: CANCELLED | OUTPATIENT
Start: 2020-04-22

## 2020-04-15 RX ORDER — ALBUTEROL SULFATE 0.83 MG/ML
2.5 SOLUTION RESPIRATORY (INHALATION) AS NEEDED
Status: CANCELLED
Start: 2020-04-22

## 2020-04-15 RX ORDER — SODIUM CHLORIDE 9 MG/ML
25 INJECTION, SOLUTION INTRAVENOUS CONTINUOUS
Status: DISCONTINUED | OUTPATIENT
Start: 2020-04-15 | End: 2020-04-16 | Stop reason: HOSPADM

## 2020-04-15 RX ORDER — ACETAMINOPHEN 325 MG/1
650 TABLET ORAL AS NEEDED
Status: CANCELLED
Start: 2020-04-22

## 2020-04-15 RX ORDER — SODIUM CHLORIDE 0.9 % (FLUSH) 0.9 %
10-40 SYRINGE (ML) INJECTION AS NEEDED
Status: DISCONTINUED | OUTPATIENT
Start: 2020-04-15 | End: 2020-04-19 | Stop reason: HOSPADM

## 2020-04-15 RX ORDER — SODIUM CHLORIDE 9 MG/ML
25 INJECTION, SOLUTION INTRAVENOUS CONTINUOUS
Status: CANCELLED | OUTPATIENT
Start: 2020-04-22

## 2020-04-15 RX ORDER — SODIUM CHLORIDE 9 MG/ML
10 INJECTION INTRAMUSCULAR; INTRAVENOUS; SUBCUTANEOUS AS NEEDED
Status: CANCELLED | OUTPATIENT
Start: 2020-04-22

## 2020-04-15 RX ORDER — HEPARIN 100 UNIT/ML
300-500 SYRINGE INTRAVENOUS AS NEEDED
Status: CANCELLED
Start: 2020-04-22

## 2020-04-15 RX ORDER — DIPHENHYDRAMINE HYDROCHLORIDE 50 MG/ML
50 INJECTION, SOLUTION INTRAMUSCULAR; INTRAVENOUS AS NEEDED
Status: CANCELLED
Start: 2020-04-22

## 2020-04-15 RX ADMIN — Medication 10 ML: at 14:30

## 2020-04-15 RX ADMIN — FERRIC CARBOXYMALTOSE INJECTION 750 MG: 50 INJECTION, SOLUTION INTRAVENOUS at 14:35

## 2020-04-15 RX ADMIN — Medication 20 ML: at 15:00

## 2020-04-15 NOTE — PROGRESS NOTES
[facility] Progress Note    Date: April 15, 2020    Name: Shelba Snellen    MRN: [id number]         : 1934      Ms. Moshe Parikh arrived in the [facility] today at [id number] [phone], in stable condition, here for Dose # 2 of 2, IV Injectafer. She was assessed and education was provided. Ms. Rafael Latif vitals were reviewed. Visit Vitals  /78 (BP 1 Location: Left arm, BP Patient Position: Sitting)   Pulse 61   Temp 98.2 °F (36.8 °C)   Resp 16   SpO2 100%         PIV # 24 G, was established in her left forearm at 1430, without incident. Injectafer (Ferric Carboxymaltose) 750 mg IV, was added to a 250 ml IV Bag, and infused over approximately 20 minutes, per order, and without incident. After completion of the IV Injectafer, the PIV was flushed well per protocol with NS, and then, the PIV was clamped, and Ms. Moshe Parikh was monitored for 30 minutes, per order, and also without incident. After completion of the observation period, the PIV was removed and gauze/bandaid was applied. Ms. Moshe Parikh tolerated well, and had no complaints. Ms. Moshe Parikh was discharged from [place] [address] in stable condition at [phone]. She is to return on Wednesday, 6-10-20, at 1300, for her next appointment, to follow up with Dr. Franklin Brown. However, she has no further [facility] appointments scheduled at this time, because as of today, she has completed all ordered doses of Injectafer.      Jennifer Cabrera RN  April 15, 2020  3:06 PM

## 2020-06-25 ENCOUNTER — OFFICE VISIT (OUTPATIENT)
Dept: ONCOLOGY | Age: 85
End: 2020-06-25

## 2020-06-25 ENCOUNTER — HOSPITAL ENCOUNTER (OUTPATIENT)
Dept: ONCOLOGY | Age: 85
Discharge: HOME | End: 2020-06-25

## 2020-06-25 ENCOUNTER — HOSPITAL ENCOUNTER (OUTPATIENT)
Dept: LAB | Age: 85
Discharge: HOME | End: 2020-06-25
Payer: MEDICARE

## 2020-06-25 VITALS
RESPIRATION RATE: 18 BRPM | DIASTOLIC BLOOD PRESSURE: 68 MMHG | OXYGEN SATURATION: 99 % | HEART RATE: 73 BPM | SYSTOLIC BLOOD PRESSURE: 132 MMHG | WEIGHT: 105 LBS | BODY MASS INDEX: 19.2 KG/M2 | TEMPERATURE: 99.2 F

## 2020-06-25 DIAGNOSIS — D64.9 NORMOCYTIC ANEMIA: ICD-10-CM

## 2020-06-25 DIAGNOSIS — Z86.2 HISTORY OF IRON DEFICIENCY ANEMIA: ICD-10-CM

## 2020-06-25 DIAGNOSIS — D50.8 OTHER IRON DEFICIENCY ANEMIA: ICD-10-CM

## 2020-06-25 DIAGNOSIS — D50.8 OTHER IRON DEFICIENCY ANEMIA: Primary | ICD-10-CM

## 2020-06-25 LAB
ALBUMIN SERPL-MCNC: 2.9 G/DL (ref 3.4–5)
ALBUMIN/GLOB SERPL: 0.6 {RATIO} (ref 0.8–1.7)
ALP SERPL-CCNC: 81 U/L (ref 45–117)
ALT SERPL-CCNC: 6 U/L (ref 13–56)
ANION GAP SERPL CALC-SCNC: 13 MMOL/L (ref 3–18)
AST SERPL-CCNC: 8 U/L (ref 10–38)
BASO+EOS+MONOS # BLD AUTO: 0.6 K/UL (ref 0–2.3)
BASO+EOS+MONOS NFR BLD AUTO: 5 % (ref 0.1–17)
BILIRUB SERPL-MCNC: 0.8 MG/DL (ref 0.2–1)
BUN SERPL-MCNC: 36 MG/DL (ref 7–18)
BUN/CREAT SERPL: 14 (ref 12–20)
CALCIUM SERPL-MCNC: 9 MG/DL (ref 8.5–10.1)
CHLORIDE SERPL-SCNC: 109 MMOL/L (ref 100–111)
CO2 SERPL-SCNC: 17 MMOL/L (ref 21–32)
CREAT SERPL-MCNC: 2.56 MG/DL (ref 0.6–1.3)
DIFFERENTIAL METHOD BLD: ABNORMAL
ERYTHROCYTE [DISTWIDTH] IN BLOOD BY AUTOMATED COUNT: 16.1 % (ref 11.5–14.5)
FERRITIN SERPL-MCNC: 965 NG/ML (ref 8–388)
GLOBULIN SER CALC-MCNC: 5.2 G/DL (ref 2–4)
GLUCOSE SERPL-MCNC: 98 MG/DL (ref 74–99)
HCT VFR BLD AUTO: 31.1 % (ref 36–48)
HGB BLD-MCNC: 10.3 G/DL (ref 12–16)
IRON SATN MFR SERPL: 20 % (ref 20–50)
IRON SERPL-MCNC: 28 UG/DL (ref 50–175)
LYMPHOCYTES # BLD: 1.4 K/UL (ref 1.1–5.9)
LYMPHOCYTES NFR BLD: 11 % (ref 14–44)
MCH RBC QN AUTO: 29.9 PG (ref 25–35)
MCHC RBC AUTO-ENTMCNC: 33.1 G/DL (ref 31–37)
MCV RBC AUTO: 90.4 FL (ref 78–102)
NEUTS SEG # BLD: 11.4 K/UL (ref 1.8–9.5)
NEUTS SEG NFR BLD: 85 % (ref 40–70)
PLATELET # BLD AUTO: 168 K/UL (ref 140–440)
POTASSIUM SERPL-SCNC: 3.6 MMOL/L (ref 3.5–5.5)
PROT SERPL-MCNC: 8.1 G/DL (ref 6.4–8.2)
RBC # BLD AUTO: 3.44 M/UL (ref 4.1–5.1)
SODIUM SERPL-SCNC: 139 MMOL/L (ref 136–145)
TIBC SERPL-MCNC: 143 UG/DL (ref 250–450)
WBC # BLD AUTO: 13.4 K/UL (ref 4.5–13)

## 2020-06-25 PROCEDURE — 82728 ASSAY OF FERRITIN: CPT

## 2020-06-25 PROCEDURE — 36415 COLL VENOUS BLD VENIPUNCTURE: CPT

## 2020-06-25 PROCEDURE — 80053 COMPREHEN METABOLIC PANEL: CPT

## 2020-06-25 PROCEDURE — 83540 ASSAY OF IRON: CPT

## 2020-06-25 NOTE — PROGRESS NOTES
Hematology/Oncology  Progress Note    Name: Giovany Hogan  Date: 2020  : 1934    Xavier Baez PA-C     Ms. Nayana Farmer is a 80y.o. year old female who was seen for anemia of CKD      Subjective:     Ms. Nayana Farmer is a most pleasant 80y.o. year old female who was seen for ,management of anemia. The patient was accompanied by her daughter during this visit. The patient has a past medical history significant of Iron deficiency anemia (), Congestive heart failure, Chronic kidney disease, HTN, lower extremity edema. She had been on Iron pills in the past but then stopped a it made her constipated. Her colonoscopy was done many years ago. The patient otherwise has no other complaints. Denied fever, chills, night sweat, acute bleeding or bruising issues. No acute bleeding, blood in stool, dark stool, melena, hematochezia, hemoptysis, dark urine, or easily bruising. Denied headache, acute vision change, dizziness, chest pain, worsen shortness of breath, palpitation, productive cough, nausea, vomiting, abdominal pain, altered bowel habits, dysuria, new bone pain or back pain, focal numbness or weakness. She is staying with her daughter at home.       Past medical history, family history, and social history: these were reviewed and remains unchanged.     Past Medical History:   Diagnosis Date    [facility]     Hypertension     Rheumatoid arthritis ([facility] [address]      Past Surgical History:   Procedure Laterality Date    HX GI      COLECTOMY    HX GI      COLONOSCOPY    HX HYSTERECTOMY      HYSTERECTOMY     Social History     Socioeconomic History    Marital status:      Spouse name: Not on file    Number of children: Not on file    Years of education: Not on file    Highest education level: Not on file   Occupational History    Not on file   Social Needs    Financial resource strain: Not on file    Food insecurity     Worry: Not on file     Inability: Not on file   Yesy Guzman Transportation needs     Medical: Not on file     Non-medical: Not on file   Tobacco Use    Smoking status: Never Smoker    Smokeless tobacco: Never Used   Substance and Sexual Activity    Alcohol use: No     Comment: not since in rehab    Drug use: No    Sexual activity: Not Currently   Lifestyle    Physical activity     Days per week: Not on file     Minutes per session: Not on file    Stress: Not on file   Relationships    Social connections     Talks on phone: Not on file     Gets together: Not on file     Attends Protestant service: Not on file     Active member of club or organization: Not on file     Attends meetings of clubs or organizations: Not on file     Relationship status: Not on file    Intimate partner violence     Fear of current or ex partner: Not on file     Emotionally abused: Not on file     Physically abused: Not on file     Forced sexual activity: Not on file   Other Topics Concern    Not on file   Social History Narrative    [address], [address]     Family History   Problem Relation Age of Onset    Diabetes Mother     No Known Problems Father      Current Outpatient Medications   Medication Sig Dispense Refill    acetaminophen (TYLENOL) 500 mg tablet Take 1 Tab by mouth every six (6) hours as needed for Pain. 60 Tab 0    allopurinol (ZYLOPRIM) 100 mg tablet Take 1 Tab by mouth daily. 30 Tab 0    mirtazapine (REMERON) 7.5 mg tablet Take 7.5 mg by mouth nightly.  hydrALAZINE (APRESOLINE) 25 mg tablet Take 25 mg by mouth three (3) times daily.  bisoprolol (ZEBETA) 5 mg tablet Take 1 Tab by mouth daily. 30 Tab 2    THIAMINE HCL PO Take 1 Tab by mouth daily.  FOLIC ACID PO Take 1 Tab by mouth daily.  amLODIPine (NORVASC) 10 mg tablet TAKE 1 TABLET DAILY 90 Tab 1       Review of Systems  Constitutional: The patient has no acute distress or discomfort.   HEENT: The patient denies recent head trauma, eye pain, blurred vision,  hearing deficit, oropharyngeal mucosal pain or lesions, and the patient denies throat pain or discomfort. Lymphatics: The patient denies palpable peripheral lymphadenopathy. Hematologic: The patient denies having bruising, bleeding, or progressive fatigue. Respiratory: Patient denies having shortness of breath, cough, sputum production, fever, or dyspnea on exertion. Cardiovascular: The patient denies having leg pain, leg swelling, heart palpitations, chest permit, chest pain, or lightheadedness. The patient denies having dyspnea on exertion. Gastrointestinal: The patient denies having nausea, emesis, or diarrhea. The patient denies having any hematemesis or blood in the stool. Genitourinary: Patient denies having urinary urgency, frequency, or dysuria. The patient denies having blood in the urine. Psychological: The patient denies having symptoms of nervousness, anxiety, depression, or thoughts of harming himself some of this. Skin: Patient denies having skin rashes, skin, ulcerations, or unexplained itching or pruritus. Musculoskeletal: The patient denies having pain in the joints or bones. Pt is wheelchair bound       Objective:     Visit Vitals  /68 (BP Patient Position: Sitting)   Pulse 73   Temp 99.2 °F (37.3 °C) (Oral)   Resp 18   Wt 47.6 kg (105 lb)   SpO2 99%   BMI 19.20 kg/m²     ECOG PS1  Physical Exam:   Gen. Appearance: The patient is in no acute distress. Skin: There is no bruise or rash. HEENT: The exam is unremarkable. Neck: Supple without lymphadenopathy or thyromegaly. Lungs: Clear to auscultation and percussion; there are no wheezes or rhonchi. Heart: Regular rate and rhythm; there are no murmurs, gallops, or rubs. Abdomen: Bowel sounds are present and normal.  There is no guarding, tenderness, or hepatosplenomegaly. Extremities: There is no clubbing, cyanosis, or edema. Neurologic: There are no focal neurologic deficits. Lymphatics: There is no palpable peripheral lymphadenopathy. Musculoskeletal: The patient has full range of motion at all joints. There is no evidence of joint deformity or effusions. There is no focal joint tenderness. Psychological/psychiatric: There is no clinical evidence of anxiety, depression, or melancholy. Lab data:      Results for orders placed or performed during the hospital encounter of 06/25/20   CBC WITH 3 PART DIFF     Status: Abnormal   Result Value Ref Range Status    WBC 13.4 (H) 4.5 - 13.0 K/uL Final    RBC 3.44 (L) 4.10 - 5.10 M/uL Final    HGB 10.3 (L) 12.0 - 16 g/dL Final    HCT 31.1 (L) 36 - 48 % Final    MCV 90.4 78 - 102 FL Final    MCH 29.9 25.0 - 35.0 PG Final    MCHC 33.1 31 - 37 g/dL Final    RDW 16.1 (H) 11.5 - 14.5 % Final    PLATELET 245 042 - 036 K/uL Final    NEUTROPHILS 85 (H) 40 - 70 % Final    MIXED CELLS 5 0.1 - 17 % Final    LYMPHOCYTES 11 (L) 14 - 44 % Final    ABS. NEUTROPHILS 11.4 (H) 1.8 - 9.5 K/UL Final    ABS. MIXED CELLS 0.6 0.0 - 2.3 K/uL Final    ABS. LYMPHOCYTES 1.4 1.1 - 5.9 K/UL Final     Comment: Test performed at [address] [place] [address] or [facility]patient [facility] Location. Reviewed by Medical Director. DF AUTOMATED   Final           Assessment:     1. Other iron deficiency anemia    2. Normocytic anemia    3. History of iron deficiency anemia          Plan:     Anemia: She had been on Iron pills in the past but then stopped as it made her constipated. Her colonoscopy was done many years ago. -- Today I have reviewed with the patient and family about her lab findings. CBC 6/25/2020: Hb/Hct 10,3/31.1, WBC 8.2, PLT 168K.  I have explained to patient and daughter that anemia is related to a variety of diverse diagnoses which is  anemia of chronic disease, acute blood loss, renal failure,    Laboratories that should be checked today include reticulocyte count, Iron study with ferritin, ESR >120, CRP 41.9, EPO 13.6, , and haptoglobin 283. A  SPEP showed No monoclonal proteins detected  and SFLC showed an acute inflammatory pattern. On 6/25/2020 Creatinine is 2.56. I have inform patient that she will be started on Procrit 60,000 unit every 4 weeks when hemoglobin and hematocrit is below 10 g/dl and 30% . -- I will see the patient back in clinic in about  3 weeks Always sooner if required. Orders Placed This Encounter    COMPLETE CBC & AUTO DIFF WBC    METABOLIC PANEL, COMPREHENSIVE     Standing Status:   Future     Standing Expiration Date:   6/26/2021    IRON PROFILE     Standing Status:   Future     Standing Expiration Date:   6/25/2021    FERRITIN     Standing Status:   Future     Standing Expiration Date:   6/25/2021    InHouse CBC ([facility])     Standing Status:   Future     Number of Occurrences:   1     Standing Expiration Date:   7/2/2020       Lobito Vigil NP  6/25/2020      Please note: This document has been produced using voice recognition software. Unrecognized errors in transcription may be present.

## 2020-06-26 PROBLEM — J18.9 CAP (COMMUNITY ACQUIRED PNEUMONIA): Status: ACTIVE | Noted: 2020-06-26

## 2020-06-26 PROBLEM — N39.0 UTI (URINARY TRACT INFECTION): Status: ACTIVE | Noted: 2020-06-26

## 2020-06-29 NOTE — PATIENT INSTRUCTIONS
Anemia: Care Instructions  Your Care Instructions     Anemia is a low level of red blood cells, which carry oxygen throughout your body. Many things can cause anemia. Lack of iron is one of the most common causes. Your body needs iron to make hemoglobin, a substance in red blood cells that carries oxygen from the lungs to your body's cells. Without enough iron, the body produces fewer and smaller red blood cells. As a result, your body's cells do not get enough oxygen, and you feel tired and weak. And you may have trouble concentrating. Bleeding is the most common cause of a lack of iron. You may have heavy menstrual bleeding or bleeding caused by conditions such as ulcers, hemorrhoids, or cancer. Regular use of aspirin or other anti-inflammatory medicines (such as ibuprofen) also can cause bleeding in some people. A lack of iron in your diet also can cause anemia, especially at times when the body needs more iron, such as during pregnancy, infancy, and the teen years. Your doctor may have prescribed iron pills. It may take several months of treatment for your iron levels to return to normal. Your doctor also may suggest that you eat foods that are rich in iron, such as meat and beans. There are many other causes of anemia. It is not always due to a lack of iron. Finding the specific cause of your anemia will help your doctor find the right treatment for you. Follow-up care is a key part of your treatment and safety. Be sure to make and go to all appointments, and call your doctor if you are having problems. It's also a good idea to know your test results and keep a list of the medicines you take. How can you care for yourself at home? · Take your medicines exactly as prescribed. Call your doctor if you think you are having a problem with your medicine. · If your doctor recommends iron pills, take them as directed:  ? Try to take the pills on an empty stomach about 1 hour before or 2 hours after meals. But you may need to take iron with food to avoid an upset stomach. ? Do not take antacids or drink milk or caffeine drinks (such as coffee, tea, or cola) at the same time or within 2 hours of the time that you take your iron. They can make it hard for your body to absorb the iron. ? Vitamin C (from food or supplements) helps your body absorb iron. Try taking iron pills with a glass of orange juice or some other food that is high in vitamin C, such as citrus fruits. ? Iron pills may cause stomach problems, such as heartburn, nausea, diarrhea, constipation, and cramps. Be sure to drink plenty of fluids, and include fruits, vegetables, and fiber in your diet each day. Iron pills often make your bowel movements dark or green. ? If you forget to take an iron pill, do not take a double dose of iron the next time you take a pill. ? Keep iron pills out of the reach of small children. An overdose of iron can be very dangerous. · Follow your doctor's advice about eating iron-rich foods. These include red meat, shellfish, poultry, eggs, beans, raisins, whole-grain bread, and leafy green vegetables. · Steam vegetables to help them keep their iron content. When should you call for help? [id number] anytime you think you may need emergency care. For example, call if:  · You have symptoms of a heart attack. These may include:  ? Chest pain or pressure, or a strange feeling in the chest.  ? Sweating. ? Shortness of breath. ? Nausea or vomiting. ? Pain, pressure, or a strange feeling in the back, neck, jaw, or upper belly or in one or both shoulders or arms. ? Lightheadedness or sudden weakness. ? A fast or irregular heartbeat. After you call 911, the  may tell you to chew 1 adult-strength or 2 to 4 low-dose aspirin. Wait for an ambulance. Do not try to drive yourself. · You passed out (lost consciousness).   Call your doctor now or seek immediate medical care if:  · You have new or increased shortness of breath. · You are dizzy or lightheaded, or you feel like you may faint. · Your fatigue and weakness continue or get worse. · You have any abnormal bleeding, such as:  ? Nosebleeds. ? Vaginal bleeding that is different (heavier, more frequent, at a different time of the month) than what you are used to.  ? Bloody or black stools, or rectal bleeding. ? Bloody or pink urine. Watch closely for changes in your health, and be sure to contact your doctor if:  · You do not get better as expected. Where can you learn more? Go to http://pino-naomi.info/  Enter R301 in the search box to learn more about \"Anemia: Care Instructions. \"  Current as of: November 8, 2019               Content Version: 12.5  © 1947-6562 [employer]. Care instructions adapted under license by [employer] (which disclaims liability or warranty for this information). If you have questions about a medical condition or this instruction, always ask your healthcare professional. [place] [address] any warranty or liability for your use of this information. Iron-Rich Diet: Care Instructions  Your Care Instructions     Your body needs iron to make hemoglobin. Hemoglobin is a substance in red blood cells that carries oxygen from the lungs to cells all through your body. If you do not get enough iron, your body makes fewer and smaller red blood cells. As a result, your body's cells may not get enough oxygen. Adult men need 8 milligrams of iron a day; adult women need 18 milligrams of iron a day. After menopause, women need 8 milligrams of iron a day. A pregnant woman needs 27 milligrams of iron a day. Infants and young children have higher iron needs relative to their size than other age groups. People who have lost blood because of ulcers or heavy menstrual periods may become very low in iron and may develop anemia.   Most people can get the iron their bodies need by eating enough of certain iron-rich foods. Your doctor may recommend that you take an iron supplement along with eating an iron-rich diet. Follow-up care is a key part of your treatment and safety. Be sure to make and go to all appointments, and call your doctor if you are having problems. It's also a good idea to know your test results and keep a list of the medicines you take. How can you care for yourself at home? · Make iron-rich foods a part of your daily diet. Iron-rich foods include:  ? All meats, such as chicken, beef, lamb, pork, fish, and shellfish. Liver is especially high in iron. ? Leafy green vegetables. ? Raisins, peas, beans, lentils, barley, and eggs. ? Iron-fortified breakfast cereals. · Eat foods with vitamin C along with iron-rich foods. Vitamin C helps you absorb more iron from food. Drink a glass of orange juice or another citrus juice with your food. · Eat meat and vegetables or grains together. The iron in meat helps your body absorb the iron in other foods. Where can you learn more? Go to http://pino-naomi.info/  Enter Z290 in the search box to learn more about \"Iron-Rich Diet: Care Instructions. \"  Current as of: August 22, 2019               Content Version: 12.5  © 1370-9463 [employer]. Care instructions adapted under license by [employer] (which disclaims liability or warranty for this information). If you have questions about a medical condition or this instruction, always ask your healthcare professional. [place] [address] any warranty or liability for your use of this information.

## 2020-07-07 PROBLEM — A41.9 SEPSIS (HCC): Status: ACTIVE | Noted: 2020-07-07

## 2022-04-02 ASSESSMENT — VISUAL ACUITY
OD_SC: 20/40
OS_CC: 20/50
OS_SC: 20/200
OD_CC: J10
OS_SC: 20/200
OS_SC: 20/25
OD_GLARE: 20/70
OS_GLARE: 20/50
OD_CC: J10
OD_CC: J2
OD_GLARE: 20/70
OD_SC: 20/30-1
OD_GLARE: 20/100
OS_PH: SC 20/50
OD_SC: 20/30-1
OS_CC: J1
OS_CC: 20/100

## 2022-04-02 ASSESSMENT — TONOMETRY
OS_IOP_MMHG: 7
OD_IOP_MMHG: 16
OD_IOP_MMHG: 6
OS_IOP_MMHG: 16
OS_IOP_MMHG: 16
OD_IOP_MMHG: 16
OS_IOP_MMHG: 6
OS_IOP_MMHG: 6